# Patient Record
Sex: MALE | Race: WHITE | Employment: UNEMPLOYED | URBAN - METROPOLITAN AREA
[De-identification: names, ages, dates, MRNs, and addresses within clinical notes are randomized per-mention and may not be internally consistent; named-entity substitution may affect disease eponyms.]

---

## 2020-05-25 ENCOUNTER — APPOINTMENT (OUTPATIENT)
Dept: ULTRASOUND IMAGING | Age: 50
End: 2020-05-25
Payer: OTHER GOVERNMENT

## 2020-05-25 ENCOUNTER — HOSPITAL ENCOUNTER (EMERGENCY)
Age: 50
Discharge: HOME | End: 2020-05-25
Attending: EMERGENCY MEDICINE
Payer: OTHER GOVERNMENT

## 2020-05-25 VITALS
HEART RATE: 90 BPM | RESPIRATION RATE: 14 BRPM | DIASTOLIC BLOOD PRESSURE: 94 MMHG | OXYGEN SATURATION: 96 % | TEMPERATURE: 97.5 F | BODY MASS INDEX: 38.36 KG/M2 | SYSTOLIC BLOOD PRESSURE: 153 MMHG | WEIGHT: 315 LBS | HEIGHT: 76 IN

## 2020-05-25 PROCEDURE — 99283 EMERGENCY DEPT VISIT LOW MDM: CPT

## 2020-05-25 PROCEDURE — 93971 EXTREMITY STUDY: CPT

## 2020-05-25 RX ORDER — HYDROCORTISONE 2.5% / KETOCONAZOLE 2% 2.5; 2 G/100G; G/100G
1 CREAM TOPICAL 3 TIMES DAILY
Qty: 30 G | Refills: 0 | Status: SHIPPED | OUTPATIENT
Start: 2020-05-25 | End: 2020-06-04

## 2020-05-25 ASSESSMENT — ENCOUNTER SYMPTOMS
COUGH: 0
CHEST TIGHTNESS: 0
SHORTNESS OF BREATH: 0
COLOR CHANGE: 0

## 2020-05-25 ASSESSMENT — PAIN SCALES - GENERAL: PAINLEVEL_OUTOF10: 5

## 2020-05-25 NOTE — ED PROVIDER NOTES
Breath sounds: Normal breath sounds. Musculoskeletal: Normal range of motion. General: Tenderness present. No swelling or deformity. Comments: Tenderness to palpation to the mid left calf. Erythematous rash to the top of the left foot and ankle. There is no warmth, induration or edema. .  Full range of motion of the ankle. No Achilles tenderness. Distal sensation intact. 2+ DPA and PTA pulses. Skin:     General: Skin is warm and dry. Capillary Refill: Capillary refill takes less than 2 seconds. Findings: No erythema. Neurological:      General: No focal deficit present. Mental Status: He is alert and oriented to person, place, and time. Mental status is at baseline. Coordination: Coordination normal.   Psychiatric:         Mood and Affect: Mood normal.         Behavior: Behavior normal.         Thought Content: Thought content normal.            ------------------------------ ED COURSE/MEDICAL DECISION MAKING----------------------  Medications - No data to display      ED COURSE:      US DUP LOWER EXTREMITY LEFT JES   Final Result   No evidence of DVT in the left lower extremity. US DUP LOWER EXTREMITY LEFT JES   Final Result   No evidence of DVT in the left lower extremity. Procedures:  Procedures     Medical Decision Making:   MDM   58-year-old male presenting the ED for an ultrasound of his left lower extremity. He has had calf pain for about the last week after injuring his leg. He was given a loading dose of Lovenox last night. Ultrasound shows no acute DVT. There is an erythematous rash but it does not appear to be cellulitis. It appears to be either eczema versus fungal in nature. Patient will be treated with topical ointment. Calf pain is likely related to a muscle strain. He is encouraged to follow-up with dermatology and his primary care physician or return with any new or worsening symptoms. Counseling:    The emergency provider has spoken with the patient and discussed todays results, in addition to providing specific details for the plan of care and counseling regarding the diagnosis and prognosis. Questions are answered at this time and they are agreeable with the plan.      --------------------------------- IMPRESSION AND DISPOSITION ---------------------------------    IMPRESSION  1. Pain of left calf        DISPOSITION  Disposition: Discharge to home  Patient condition is good      Electronically signed by Whit Alberts PA-C   DD: 5/25/20  **This report was transcribed using voice recognition software. Every effort was made to ensure accuracy; however, inadvertent computerized transcription errors may be present.   END OF ED PROVIDER NOTE          Whit Alberts PA-C  05/25/20 0334

## 2020-07-06 ENCOUNTER — HOSPITAL ENCOUNTER (EMERGENCY)
Age: 50
Discharge: HOME | End: 2020-07-06
Attending: EMERGENCY MEDICINE
Payer: OTHER GOVERNMENT

## 2020-07-06 VITALS
BODY MASS INDEX: 38.36 KG/M2 | SYSTOLIC BLOOD PRESSURE: 148 MMHG | RESPIRATION RATE: 16 BRPM | TEMPERATURE: 97.2 F | HEART RATE: 88 BPM | OXYGEN SATURATION: 98 % | DIASTOLIC BLOOD PRESSURE: 84 MMHG | WEIGHT: 315 LBS | HEIGHT: 76 IN

## 2020-07-06 LAB
ANION GAP SERPL CALCULATED.3IONS-SCNC: 15 MMOL/L (ref 7–16)
BUN BLDV-MCNC: 19 MG/DL (ref 6–20)
CALCIUM SERPL-MCNC: 9.4 MG/DL (ref 8.6–10.2)
CHLORIDE BLD-SCNC: 98 MMOL/L (ref 98–107)
CO2: 22 MMOL/L (ref 22–29)
CREAT SERPL-MCNC: 1 MG/DL (ref 0.7–1.2)
D DIMER: <200 NG/ML DDU
GFR AFRICAN AMERICAN: >60
GFR NON-AFRICAN AMERICAN: >60 ML/MIN/1.73
GLUCOSE BLD-MCNC: 272 MG/DL (ref 74–99)
HCT VFR BLD CALC: 49.2 % (ref 37–54)
HEMOGLOBIN: 16.7 G/DL (ref 12.5–16.5)
MCH RBC QN AUTO: 32.6 PG (ref 26–35)
MCHC RBC AUTO-ENTMCNC: 33.9 % (ref 32–34.5)
MCV RBC AUTO: 96.1 FL (ref 80–99.9)
PDW BLD-RTO: 13.3 FL (ref 11.5–15)
PLATELET # BLD: 139 E9/L (ref 130–450)
PMV BLD AUTO: 10.2 FL (ref 7–12)
POTASSIUM SERPL-SCNC: 4.1 MMOL/L (ref 3.5–5)
RBC # BLD: 5.12 E12/L (ref 3.8–5.8)
SODIUM BLD-SCNC: 135 MMOL/L (ref 132–146)
WBC # BLD: 9.6 E9/L (ref 4.5–11.5)

## 2020-07-06 PROCEDURE — 96372 THER/PROPH/DIAG INJ SC/IM: CPT

## 2020-07-06 PROCEDURE — 85027 COMPLETE CBC AUTOMATED: CPT

## 2020-07-06 PROCEDURE — 6360000002 HC RX W HCPCS: Performed by: STUDENT IN AN ORGANIZED HEALTH CARE EDUCATION/TRAINING PROGRAM

## 2020-07-06 PROCEDURE — 85378 FIBRIN DEGRADE SEMIQUANT: CPT

## 2020-07-06 PROCEDURE — 80048 BASIC METABOLIC PNL TOTAL CA: CPT

## 2020-07-06 PROCEDURE — 99283 EMERGENCY DEPT VISIT LOW MDM: CPT

## 2020-07-06 RX ORDER — IBUPROFEN 800 MG/1
800 TABLET ORAL EVERY 8 HOURS PRN
Qty: 30 TABLET | Refills: 0 | Status: ON HOLD | OUTPATIENT
Start: 2020-07-06 | End: 2020-10-29 | Stop reason: HOSPADM

## 2020-07-06 RX ORDER — KETOROLAC TROMETHAMINE 30 MG/ML
30 INJECTION, SOLUTION INTRAMUSCULAR; INTRAVENOUS ONCE
Status: COMPLETED | OUTPATIENT
Start: 2020-07-06 | End: 2020-07-06

## 2020-07-06 RX ADMIN — KETOROLAC TROMETHAMINE 30 MG: 30 INJECTION, SOLUTION INTRAMUSCULAR at 02:04

## 2020-07-06 ASSESSMENT — PAIN SCALES - GENERAL: PAINLEVEL_OUTOF10: 10

## 2020-07-06 ASSESSMENT — PAIN DESCRIPTION - FREQUENCY: FREQUENCY: CONTINUOUS

## 2020-07-06 ASSESSMENT — PAIN DESCRIPTION - ONSET: ONSET: ON-GOING

## 2020-07-06 ASSESSMENT — PAIN DESCRIPTION - LOCATION: LOCATION: LEG

## 2020-07-06 ASSESSMENT — ENCOUNTER SYMPTOMS
BACK PAIN: 0
VOMITING: 0
RHINORRHEA: 0
ABDOMINAL PAIN: 0
NAUSEA: 0
SHORTNESS OF BREATH: 0

## 2020-07-06 ASSESSMENT — PAIN DESCRIPTION - PROGRESSION: CLINICAL_PROGRESSION: GRADUALLY WORSENING

## 2020-07-06 ASSESSMENT — PAIN DESCRIPTION - PAIN TYPE: TYPE: ACUTE PAIN

## 2020-07-06 ASSESSMENT — PAIN DESCRIPTION - DESCRIPTORS: DESCRIPTORS: STABBING

## 2020-07-06 ASSESSMENT — PAIN - FUNCTIONAL ASSESSMENT: PAIN_FUNCTIONAL_ASSESSMENT: INTOLERABLE, UNABLE TO DO ANY ACTIVE OR PASSIVE ACTIVITIES

## 2020-07-06 NOTE — ED PROVIDER NOTES
Patient is a 79-year-old male who presents to the emergency department PO with a chief complaint of left leg pain. Patient states the pain is been ongoing for approximately 6 weeks and he has been evaluated at the [place] as well as in our hospital system. Patient states pain is to the left thigh and calf and has been progressively worsening over the past 2 days over baseline. He describes the pain as sharp, nonradiating, isolated to the medial calf. Patient has had a DVT study in 5/2020 which was negative. Patient has been followed by the [place] system and is scheduled to see podiatry in the coming week. Patient states pain has been increasing to the left medial calf over the past 2 days. He is concerned for deep venous thrombosis. He has not been taking any medications for pain as he does not like to take pills. Patient denies any new trauma. Patient denies fever, chills, headache, shortness of breath, chest pain, paresthesias, weakness. Review of Systems   Constitutional: Negative for activity change, appetite change, chills and fever. HENT: Negative for congestion and rhinorrhea. Respiratory: Negative for shortness of breath. Cardiovascular: Negative for chest pain. Gastrointestinal: Negative for abdominal pain, nausea and vomiting. Genitourinary: Negative for dysuria. Musculoskeletal: Positive for myalgias. Negative for arthralgias, back pain and neck pain. Skin: Negative for wound. Neurological: Negative for dizziness, syncope, weakness, light-headedness and headaches. Physical Exam  Vitals signs and nursing note reviewed. Constitutional:       General: He is not in acute distress. Appearance: Normal appearance. He is obese. He is not ill-appearing or toxic-appearing. HENT:      Head: Normocephalic and atraumatic. Nose: Nose normal.      Mouth/Throat:      Mouth: Mucous membranes are moist.      Pharynx: Oropharynx is clear.    Eyes:      Extraocular Movements: Extraocular movements intact. Pupils: Pupils are equal, round, and reactive to light. Neck:      Musculoskeletal: Normal range of motion and neck supple. Cardiovascular:      Rate and Rhythm: Normal rate and regular rhythm. Pulses: Normal pulses. Heart sounds: Normal heart sounds. Pulmonary:      Effort: Pulmonary effort is normal.      Breath sounds: Normal breath sounds. Abdominal:      General: Bowel sounds are normal.      Palpations: Abdomen is soft. Musculoskeletal: Normal range of motion. Legs:    Skin:     General: Skin is warm and dry. Capillary Refill: Capillary refill takes less than 2 seconds. Comments: Right thigh/calf with erythema. Non-tender, no swelling, not hot. Left thigh/calf with discoloration, no erythema. Neurological:      Mental Status: He is alert and oriented to person, place, and time. MDM  Number of Diagnoses or Management Options  Pain in left leg:   Diagnosis management comments: Patient is a 58-year-old male presents with increased left leg pain over baseline. Patient has had pain for approximately 6 weeks and has been followed by [place] system as well as [facility]) system including a DVT study that was reviewed, negative. Patient states pain is increased over the past 2 days, no new trauma. Patient is awake alert following all commands, vital signs reviewed, afebrile. Patient is neurovascularly intact. Patient denies any sedentary lifestyle, malignancy, shortness of breath. Patient with left leg pain is concern for new DVT. Patient [facility] score is low risk for DVT. Patient physical exam is significant for tenderness to the left calf, albeit more lateral aspect. Homans sign is negative. Patient with noted skin discoloration to the bilateral legs, consistent with peripheral vascular disease. Patient without any swelling, erythema, warmness. Decision was made for d-dimer which was negative.   Basic labs also obtained with no significant findings. Patient treated for pain with Toradol for analgesia. Patient with relief in pain. Patient reassured and low risk for DVT. labs discussed with patient and plan for discharge and follow-up with primary care. Patient is agreeable to plan and is stable for discharge. All questions answered at bedside. Patient agrees to follow-up with PCP. Amount and/or Complexity of Data Reviewed  Clinical lab tests: ordered and reviewed           --------------------------------------------- PAST HISTORY ---------------------------------------------  Past Medical History:  has a past medical history of Anxiety, Depression, Headache(784.0), Hypertension, Narcolepsy, Obesity, PTSD (post-traumatic stress disorder), Tinnitus, Tobacco abuse, and Unspecified sleep apnea. Past Surgical History:  has a past surgical history that includes Hand surgery. Social History:  reports that he has been smoking cigarettes. He has a 30.00 pack-year smoking history. He has never used smokeless tobacco. He reports that he does not drink alcohol or use drugs. Family History: family history is not on file. The patients home medications have been reviewed. Allergies: Patient has no known allergies.     -------------------------------------------------- RESULTS -------------------------------------------------  Labs:  Results for orders placed or performed during the hospital encounter of 07/06/20   D-Dimer, Quantitative   Result Value Ref Range    D-Dimer, Quant <200 ng/mL DDU   CBC   Result Value Ref Range    WBC 9.6 4.5 - 11.5 E9/L    RBC 5.12 3.80 - 5.80 E12/L    Hemoglobin 16.7 (H) 12.5 - 16.5 g/dL    Hematocrit 49.2 37.0 - 54.0 %    MCV 96.1 80.0 - 99.9 fL    MCH 32.6 26.0 - 35.0 pg    MCHC 33.9 32.0 - 34.5 %    RDW 13.3 11.5 - 15.0 fL    Platelets 823 752 - 622 E9/L    MPV 10.2 7.0 - 12.0 fL   Basic metabolic panel   Result Value Ref Range    Sodium 135 132 - 146 mmol/L    Potassium 4.1 3.5 - 5.0 mmol/L    Chloride 98 98 - 107 mmol/L    CO2 22 22 - 29 mmol/L    Anion Gap 15 7 - 16 mmol/L    Glucose 272 (H) 74 - 99 mg/dL    BUN 19 6 - 20 mg/dL    CREATININE 1.0 0.7 - 1.2 mg/dL    GFR Non-African American >60 >=60 mL/min/1.73    GFR African American >60     Calcium 9.4 8.6 - 10.2 mg/dL       Radiology:  No orders to display       ------------------------- NURSING NOTES AND VITALS REVIEWED ---------------------------  Date / Time Roomed:  7/6/2020 12:56 AM  ED Bed Assignment:  14/14    The nursing notes within the ED encounter and vital signs as below have been reviewed. BP (!) 148/84   Pulse 88   Temp 97.2 °F (36.2 °C) (Temporal)   Resp 16   Ht 6' 4\" (1.93 m)   Wt (!) 330 lb (149.7 kg)   SpO2 98%   BMI 40.17 kg/m²   Oxygen Saturation Interpretation: Normal      ------------------------------------------ PROGRESS NOTES ------------------------------------------  7:52 AM EDT  I have spoken with the patient and discussed todays results, in addition to providing specific details for the plan of care and counseling regarding the diagnosis and prognosis. Their questions are answered at this time and they are agreeable with the plan. I discussed at length with them reasons for immediate return here for re evaluation. They will followup with their primary care physician by calling their office on Monday.      --------------------------------- ADDITIONAL PROVIDER NOTES ---------------------------------  At this time the patient is without objective evidence of an acute process requiring hospitalization or inpatient management. They have remained hemodynamically stable throughout their entire ED visit and are stable for discharge with outpatient follow-up. The plan has been discussed in detail and they are aware of the specific conditions for emergent return, as well as the importance of follow-up.       Discharge Medication List as of 7/6/2020  2:11 AM      START taking these medications Details   ibuprofen (ADVIL;MOTRIN) 800 MG tablet Take 1 tablet by mouth every 8 hours as needed for Pain, Disp-30 tablet, R-0Print           Diagnosis:  1. Pain in left leg      Disposition:  Patient's disposition: Discharge to home  Patient's condition is stable. 7/6/20, 7:52 AM EDT.     This note is prepared by Quan Araiza MD -[id number]-[id number]             Quan Araiza MD  Resident  07/06/20 [id number]

## 2020-10-28 ENCOUNTER — HOSPITAL ENCOUNTER (INPATIENT)
Age: 50
LOS: 1 days | Discharge: ANOTHER HOSPITAL | DRG: 066 | End: 2020-10-29
Attending: EMERGENCY MEDICINE | Admitting: INTERNAL MEDICINE
Payer: OTHER GOVERNMENT

## 2020-10-28 ENCOUNTER — APPOINTMENT (OUTPATIENT)
Dept: CT IMAGING | Age: 50
DRG: 066 | End: 2020-10-28
Payer: OTHER GOVERNMENT

## 2020-10-28 ENCOUNTER — APPOINTMENT (OUTPATIENT)
Dept: GENERAL RADIOLOGY | Age: 50
DRG: 066 | End: 2020-10-28
Payer: OTHER GOVERNMENT

## 2020-10-28 PROBLEM — I63.9 ACUTE CVA (CEREBROVASCULAR ACCIDENT) (HCC): Status: ACTIVE | Noted: 2020-10-28

## 2020-10-28 LAB
ALBUMIN SERPL-MCNC: 4.7 G/DL (ref 3.5–5.2)
ALP BLD-CCNC: 85 U/L (ref 40–129)
ALT SERPL-CCNC: 60 U/L (ref 0–40)
ANION GAP SERPL CALCULATED.3IONS-SCNC: 15 MMOL/L (ref 7–16)
APTT: 27.1 SEC (ref 24.5–35.1)
AST SERPL-CCNC: 51 U/L (ref 0–39)
BASOPHILS ABSOLUTE: 0.05 E9/L (ref 0–0.2)
BASOPHILS RELATIVE PERCENT: 0.4 % (ref 0–2)
BILIRUB SERPL-MCNC: 0.5 MG/DL (ref 0–1.2)
BILIRUBIN URINE: NEGATIVE
BLOOD, URINE: NEGATIVE
BUN BLDV-MCNC: 17 MG/DL (ref 6–20)
CALCIUM SERPL-MCNC: 10 MG/DL (ref 8.6–10.2)
CHLORIDE BLD-SCNC: 95 MMOL/L (ref 98–107)
CHP ED QC CHECK: YES
CLARITY: CLEAR
CO2: 24 MMOL/L (ref 22–29)
COLOR: YELLOW
CREAT SERPL-MCNC: 0.9 MG/DL (ref 0.7–1.2)
EOSINOPHILS ABSOLUTE: 0.22 E9/L (ref 0.05–0.5)
EOSINOPHILS RELATIVE PERCENT: 1.7 % (ref 0–6)
GFR AFRICAN AMERICAN: >60
GFR NON-AFRICAN AMERICAN: >60 ML/MIN/1.73
GLUCOSE BLD-MCNC: 171 MG/DL (ref 74–99)
GLUCOSE BLD-MCNC: 176 MG/DL
GLUCOSE URINE: NEGATIVE MG/DL
HCT VFR BLD CALC: 54.7 % (ref 37–54)
HEMOGLOBIN: 18.4 G/DL (ref 12.5–16.5)
IMMATURE GRANULOCYTES #: 0.06 E9/L
IMMATURE GRANULOCYTES %: 0.5 % (ref 0–5)
INR BLD: 1
KETONES, URINE: NEGATIVE MG/DL
LEUKOCYTE ESTERASE, URINE: NEGATIVE
LYMPHOCYTES ABSOLUTE: 2.51 E9/L (ref 1.5–4)
LYMPHOCYTES RELATIVE PERCENT: 19.2 % (ref 20–42)
MCH RBC QN AUTO: 32.2 PG (ref 26–35)
MCHC RBC AUTO-ENTMCNC: 33.6 % (ref 32–34.5)
MCV RBC AUTO: 95.6 FL (ref 80–99.9)
METER GLUCOSE: 176 MG/DL (ref 74–99)
MONOCYTES ABSOLUTE: 1.1 E9/L (ref 0.1–0.95)
MONOCYTES RELATIVE PERCENT: 8.4 % (ref 2–12)
NEUTROPHILS ABSOLUTE: 9.15 E9/L (ref 1.8–7.3)
NEUTROPHILS RELATIVE PERCENT: 69.8 % (ref 43–80)
NITRITE, URINE: NEGATIVE
PDW BLD-RTO: 13.6 FL (ref 11.5–15)
PH UA: 5 (ref 5–9)
PLATELET # BLD: 165 E9/L (ref 130–450)
PMV BLD AUTO: 10.4 FL (ref 7–12)
POTASSIUM REFLEX MAGNESIUM: 4.2 MMOL/L (ref 3.5–5)
PROTEIN UA: NEGATIVE MG/DL
PROTHROMBIN TIME: 11.5 SEC (ref 9.3–12.4)
RBC # BLD: 5.72 E12/L (ref 3.8–5.8)
SODIUM BLD-SCNC: 134 MMOL/L (ref 132–146)
SPECIFIC GRAVITY UA: <=1.005 (ref 1–1.03)
TOTAL PROTEIN: 8.4 G/DL (ref 6.4–8.3)
TROPONIN: <0.01 NG/ML (ref 0–0.03)
UROBILINOGEN, URINE: 0.2 E.U./DL
WBC # BLD: 13.1 E9/L (ref 4.5–11.5)

## 2020-10-28 PROCEDURE — 6360000004 HC RX CONTRAST MEDICATION: Performed by: RADIOLOGY

## 2020-10-28 PROCEDURE — 70496 CT ANGIOGRAPHY HEAD: CPT

## 2020-10-28 PROCEDURE — 82962 GLUCOSE BLOOD TEST: CPT

## 2020-10-28 PROCEDURE — 70498 CT ANGIOGRAPHY NECK: CPT

## 2020-10-28 PROCEDURE — 6370000000 HC RX 637 (ALT 250 FOR IP): Performed by: STUDENT IN AN ORGANIZED HEALTH CARE EDUCATION/TRAINING PROGRAM

## 2020-10-28 PROCEDURE — 2580000003 HC RX 258: Performed by: STUDENT IN AN ORGANIZED HEALTH CARE EDUCATION/TRAINING PROGRAM

## 2020-10-28 PROCEDURE — 85025 COMPLETE CBC W/AUTO DIFF WBC: CPT

## 2020-10-28 PROCEDURE — 84484 ASSAY OF TROPONIN QUANT: CPT

## 2020-10-28 PROCEDURE — 80053 COMPREHEN METABOLIC PANEL: CPT

## 2020-10-28 PROCEDURE — 85610 PROTHROMBIN TIME: CPT

## 2020-10-28 PROCEDURE — 93005 ELECTROCARDIOGRAM TRACING: CPT | Performed by: STUDENT IN AN ORGANIZED HEALTH CARE EDUCATION/TRAINING PROGRAM

## 2020-10-28 PROCEDURE — 70450 CT HEAD/BRAIN W/O DYE: CPT

## 2020-10-28 PROCEDURE — 1200000000 HC SEMI PRIVATE

## 2020-10-28 PROCEDURE — 99284 EMERGENCY DEPT VISIT MOD MDM: CPT

## 2020-10-28 PROCEDURE — 36415 COLL VENOUS BLD VENIPUNCTURE: CPT

## 2020-10-28 PROCEDURE — 81003 URINALYSIS AUTO W/O SCOPE: CPT

## 2020-10-28 PROCEDURE — 71045 X-RAY EXAM CHEST 1 VIEW: CPT

## 2020-10-28 PROCEDURE — 2580000003 HC RX 258: Performed by: INTERNAL MEDICINE

## 2020-10-28 PROCEDURE — 94660 CPAP INITIATION&MGMT: CPT

## 2020-10-28 PROCEDURE — 0042T CT BRAIN PERFUSION: CPT

## 2020-10-28 PROCEDURE — 85730 THROMBOPLASTIN TIME PARTIAL: CPT

## 2020-10-28 RX ORDER — CLOPIDOGREL BISULFATE 75 MG/1
75 TABLET ORAL DAILY
Status: DISCONTINUED | OUTPATIENT
Start: 2020-10-29 | End: 2020-10-29 | Stop reason: HOSPADM

## 2020-10-28 RX ORDER — SODIUM CHLORIDE 0.9 % (FLUSH) 0.9 %
10 SYRINGE (ML) INJECTION PRN
Status: DISCONTINUED | OUTPATIENT
Start: 2020-10-28 | End: 2020-10-29 | Stop reason: HOSPADM

## 2020-10-28 RX ORDER — ASPIRIN 81 MG/1
81 TABLET, CHEWABLE ORAL DAILY
Status: DISCONTINUED | OUTPATIENT
Start: 2020-10-29 | End: 2020-10-29 | Stop reason: HOSPADM

## 2020-10-28 RX ORDER — DEXTROSE MONOHYDRATE 25 G/50ML
12.5 INJECTION, SOLUTION INTRAVENOUS PRN
Status: DISCONTINUED | OUTPATIENT
Start: 2020-10-28 | End: 2020-10-29 | Stop reason: HOSPADM

## 2020-10-28 RX ORDER — DEXTROSE MONOHYDRATE 50 MG/ML
100 INJECTION, SOLUTION INTRAVENOUS PRN
Status: DISCONTINUED | OUTPATIENT
Start: 2020-10-28 | End: 2020-10-29 | Stop reason: HOSPADM

## 2020-10-28 RX ORDER — SODIUM CHLORIDE 0.9 % (FLUSH) 0.9 %
10 SYRINGE (ML) INJECTION EVERY 12 HOURS SCHEDULED
Status: DISCONTINUED | OUTPATIENT
Start: 2020-10-28 | End: 2020-10-29 | Stop reason: HOSPADM

## 2020-10-28 RX ORDER — NICOTINE POLACRILEX 4 MG
15 LOZENGE BUCCAL PRN
Status: DISCONTINUED | OUTPATIENT
Start: 2020-10-28 | End: 2020-10-29 | Stop reason: HOSPADM

## 2020-10-28 RX ORDER — 0.9 % SODIUM CHLORIDE 0.9 %
1000 INTRAVENOUS SOLUTION INTRAVENOUS ONCE
Status: COMPLETED | OUTPATIENT
Start: 2020-10-28 | End: 2020-10-28

## 2020-10-28 RX ORDER — POLYETHYLENE GLYCOL 3350 17 G/17G
17 POWDER, FOR SOLUTION ORAL DAILY PRN
Status: DISCONTINUED | OUTPATIENT
Start: 2020-10-28 | End: 2020-10-29 | Stop reason: HOSPADM

## 2020-10-28 RX ORDER — ASPIRIN 81 MG/1
324 TABLET, CHEWABLE ORAL ONCE
Status: COMPLETED | OUTPATIENT
Start: 2020-10-28 | End: 2020-10-28

## 2020-10-28 RX ORDER — ACETAMINOPHEN 325 MG/1
650 TABLET ORAL EVERY 6 HOURS PRN
Status: DISCONTINUED | OUTPATIENT
Start: 2020-10-28 | End: 2020-10-29 | Stop reason: HOSPADM

## 2020-10-28 RX ORDER — ACETAMINOPHEN 650 MG/1
650 SUPPOSITORY RECTAL EVERY 6 HOURS PRN
Status: DISCONTINUED | OUTPATIENT
Start: 2020-10-28 | End: 2020-10-29 | Stop reason: HOSPADM

## 2020-10-28 RX ADMIN — SODIUM CHLORIDE 1000 ML: 9 INJECTION, SOLUTION INTRAVENOUS at 17:51

## 2020-10-28 RX ADMIN — IOPAMIDOL 120 ML: 755 INJECTION, SOLUTION INTRAVENOUS at 17:39

## 2020-10-28 RX ADMIN — ASPIRIN 324 MG: 81 TABLET, CHEWABLE ORAL at 19:16

## 2020-10-28 RX ADMIN — Medication 10 ML: at 21:39

## 2020-10-28 ASSESSMENT — ENCOUNTER SYMPTOMS
EYE PAIN: 0
DIARRHEA: 0
EYE REDNESS: 0
TACHYPNEA: 1
CHEST TIGHTNESS: 0
WHEEZING: 0
SHORTNESS OF BREATH: 0
BACK PAIN: 0
RHINORRHEA: 0
CONSTIPATION: 0
TROUBLE SWALLOWING: 0
VOMITING: 0
APNEA: 0
PHOTOPHOBIA: 0
COUGH: 0
NAUSEA: 0
SORE THROAT: 0
ABDOMINAL PAIN: 0

## 2020-10-28 NOTE — CONSULTS
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 Patients symptoms and exam improvied while in ER . States TPA not given because of improving symptoms. Patient had previous episode that apparently resolved one month ago. Discussed risk vs benefit of intervention, risks of intervention appear to be greater then benefits at this time. A/P Probable hemodynamically  significant atherosclerotic disease of basilar artery and bilateral carotids with significant stenopsis. No intervention at this time. Consider basilar artery intervention once maximal antiplatelet for 5 days or significant symptoms with  failed medical therapy. Transfer as necessary or if change in status. MRI for further evaluation to evaluate for brainstem infarct.

## 2020-10-28 NOTE — ED PROVIDER NOTES
[address] [facility] 5 PIC/ICU  EMERGENCY DEPARTMENT ENCOUNTER      Pt Name: Jose Conway  MRN: [id number]  [place] 1970  Date of evaluation: 10/28/2020      CHIEF COMPLAINT       Chief Complaint   Patient presents with    Cerebrovascular Accident        HPI  Jose Conway is a 45 y.o. male with history of hypertension, diabetes who presents to the emergency department with complaints of right upper and right lower extremity decreased sensation. The patient states that approximately an hour prior to arrival he started having decrease in station to his right upper and right lower extremity. He states he also began to notice left-sided vision changes as well as facial droop. He states that the facial droop and vision changes improved after about 10 to 15 minutes. He continued to have decrease in station and numbness feeling to his arm and leg and therefore called 911. He states that his symptoms have mildly improved at this point is only having decreased incision to his right arm. He states that about a month ago he had several episodes of similar decreased sensation that lasted about 10 minutes before resolving. At that time he went to a ER in [place] where they discharged him after \"normal head CT and blood\". Currently patient denies any headache, vision changes, chest pain, shortness of breath. Denies any injury or trauma. Denies any history of any stroke or brain bleed. Is not any blood thinners. Except as noted above the remainder of the review of systems was reviewed and negative. Review of Systems   Constitutional: Negative for activity change, chills, diaphoresis, fatigue and fever. HENT: Negative for rhinorrhea, sore throat and trouble swallowing. Eyes: Positive for visual disturbance. Negative for photophobia, pain and redness. Respiratory: Negative for apnea, cough, chest tightness, shortness of breath and wheezing.     Cardiovascular: Negative for chest pain, palpitations and leg swelling. Gastrointestinal: Negative for abdominal pain, constipation, diarrhea, nausea and vomiting. Endocrine: Negative for polyuria. Genitourinary: Negative for difficulty urinating and dysuria. Musculoskeletal: Negative for back pain, neck pain and neck stiffness. Skin: Negative for pallor and rash. Neurological: Positive for facial asymmetry and numbness. Negative for dizziness, syncope, weakness and light-headedness. Psychiatric/Behavioral: Negative for confusion. The patient is not nervous/anxious. Physical Exam  Vitals signs and nursing note reviewed. Constitutional:       General: He is not in acute distress. Appearance: He is well-developed. Comments: Awake and alert. Sitting in the gurney in no obvious distress. HENT:      Head: Normocephalic and atraumatic. Mouth/Throat:      Mouth: Mucous membranes are moist.      Pharynx: No oropharyngeal exudate. Eyes:      General: No scleral icterus. Pupils: Pupils are equal, round, and reactive to light. Neck:      Musculoskeletal: Normal range of motion and neck supple. Cardiovascular:      Rate and Rhythm: Normal rate and regular rhythm. Heart sounds: Normal heart sounds. No murmur. Comments: 2+ radial and dorsal pedis pulses bilaterally  Pulmonary:      Effort: Pulmonary effort is normal. No respiratory distress. Breath sounds: Normal breath sounds. No wheezing. Abdominal:      Palpations: Abdomen is soft. Tenderness: There is no abdominal tenderness. There is no guarding or rebound. Musculoskeletal: Normal range of motion. General: No tenderness or deformity. Right lower leg: No edema. Left lower leg: No edema. Skin:     General: Skin is warm and dry. Capillary Refill: Capillary refill takes less than 2 seconds. Findings: No rash. Neurological:      Mental Status: He is alert and oriented to person, place, and time.       Cranial Nerves: No cranial nerve deficit. Sensory: No sensory deficit. Motor: No weakness or abnormal muscle tone. Comments: Mild drift to the right upper extremity. Decreased sensation to the right upper extremity. No facial droop. Normal strength and sensation to the lower extremities bilaterally as well as the left upper extremity. No dysphagia or dysarthria. Cranial nerves grossly intact without obvious facial droop. Psychiatric:         Mood and Affect: Mood normal.         Behavior: Behavior normal.          Procedures     MDM   This is a 77-year-old male with a history of diabetes on Metformin who presents to the emergency department with acute onset right-sided upper and lower extremity sensory change as well as vision changes to his left eye and facial droop that began about an hour prior to arrival.  The vision changes and facial droop resolved prior to arrival.  Initial exam shows mild drift to the right upper extremity and decreased sensation to the right upper extremity. His other symptoms had improved. NIH initially of 2. Stroke alert called. Telemetry neuro stroke service was consulted and agreed that TPA is not indicated given the patient's improving symptoms and low NIH as risks would outweigh the benefits of any TPA administration. 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 Discussed case with Dr. Narinder Rome, who reviewed the case and agreed that at this time further invasive intervention by interventional radiology is not indicated given patient improving symptoms low and age and risks of surgery would by far outweigh any benefits. The patient understood this and agrees to the plan. adMinistered aspirin to treat medically. Metabolic panel showed normal electrolytes, normal renal function with creatinine 0.9.   EKG showed no ischemic change troponin was negative and this likely acute cardiac etiology of his symptoms. Patient continue remained stable. Dr. Izaiah Orozco recommended that if he did have any progression of symptoms at that point repeat work-up should be initiated and possibly patient transferred downtown for further evaluation given findings today. Discussed the case with Dr. Devon Bravo, who accepts patient for Dr. Castillo Tamayo for further evaluation and neurologic evaluation. ED Course as of Oct 28 2053   Wed Oct 28, 2020   1644 ATTENDING PROVIDER ATTESTATION:     Marianela Mancuso presented to the emergency department for evaluation of [unfilled]  I have reviewed and discussed the case, including pertinent history (medical, surgical, family and social) and exam findings with the Midlevel and the Nurse assigned to Marianela Mancuso. I have personally performed and/or participated in the history, exam, medical decision making, and procedures and agree with all pertinent clinical information. I have reviewed my findings and recommendations with Marianela Mancuso and members of family present at the time of disposition. My findings/plan: Patient is a 40-year-old male who presents the chief complaint of right-sided facial droop and right-sided tingling of the arm and leg. Patient states his symptoms started about an hour prior to arrival.  He states that he was walking down his driveway to get his mail when he had onset of tingling of the left upper and lower extremity, then he started to feel a facial droop on the right side. Patient states that the facial droop has improved but he still has remaining tingling of the arm and leg. He states that he has had intermittent tingling of the right arm and leg for about a month, but the symptoms do not last very long. States that this is the long that they have ever lasted which prompted him to come to the emergency department. He denies any history of CVA in the past.  Patient denies any recent head trauma or falls.   On examination the patient is resting comfortably in bed. Clear breath sounds in all lung fields. Regular rate and rhythm of the heart. No abdominal tenderness to palpation. No lower extremity edema. Patient has no facial droop or slurred speech. Pupils are equal and reactive bilaterally. No hemotympanum noted. Patient does have mild sensory deficit of the right upper extremity. Normal  strength. NIH 2. Patient is alert and oriented x3. Scooby Ortega DO      [MS]   8141 Discussed with radiologist about the Ct head without contrast: Who does not see acute hemorrhage or large acute infarct on Noncon CT scan of the head.    [LM]   1218 Discussed with neurology telestroke Dr. Za Purvis, who recommends it given the patient's sensory symptoms only, improvement in symptoms since onset 90 minutes ago, low NIH score, that TPA is not recommended at this time. Continue to wait for CTA imaging to result. Patient remained stable.    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 [LM]   9770 Exam the patient he continues just to have mild sensory change of the right upper extremity. Discussed with him the CT results and that we are waiting for the neuro interventional radiologist.  He is requesting food however we will have him n.p.o. until after I talk with the interventionalists. [LM]      ED Course User Index  [LM] Gerard Wood DO  [MS] Analilia Mistry DO         ED Course as of Oct 28 2053   Wed Oct 28, 2020   1644 ATTENDING PROVIDER ATTESTATION:     Vikas Cheung presented to the emergency department for evaluation of [unfilled]  I have reviewed and discussed the case, including pertinent history (medical, surgical, family and social) and exam findings with the Midlevel and the Nurse assigned to Vikas Cheung.   I have personally performed and/or participated in the history, exam, medical decision making, and procedures and agree with all pertinent clinical information. I have reviewed my findings and recommendations with Alaina Gao and members of family present at the time of disposition. My findings/plan: Patient is a 62-year-old male who presents the chief complaint of right-sided facial droop and right-sided tingling of the arm and leg. Patient states his symptoms started about an hour prior to arrival.  He states that he was walking down his driveway to get his mail when he had onset of tingling of the left upper and lower extremity, then he started to feel a facial droop on the right side. Patient states that the facial droop has improved but he still has remaining tingling of the arm and leg. He states that he has had intermittent tingling of the right arm and leg for about a month, but the symptoms do not last very long. States that this is the long that they have ever lasted which prompted him to come to the emergency department. He denies any history of CVA in the past.  Patient denies any recent head trauma or falls. On examination the patient is resting comfortably in bed. Clear breath sounds in all lung fields. Regular rate and rhythm of the heart. No abdominal tenderness to palpation. No lower extremity edema. Patient has no facial droop or slurred speech. Pupils are equal and reactive bilaterally. No hemotympanum noted. Patient does have mild sensory deficit of the right upper extremity. Normal  strength. NIH 2. Patient is alert and oriented x3. Farhana Galindo DO      [MS]   9615 Discussed with radiologist about the Ct head without contrast: Who does not see acute hemorrhage or large acute infarct on Noncon CT scan of the head.    [LM]   9848 Discussed with neurology telestroke Dr. Miguel Guallpa, who recommends it given the patient's sensory symptoms only, improvement in symptoms since onset 90 minutes ago, low NIH score, that TPA is not recommended at this time. Continue to wait for CTA imaging to result.   Patient remained stable.    [LM]   0870 Discussed with Dr. 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 [LM]   1840 Exam the patient he continues just to have mild sensory change of the right upper extremity. Discussed with him the CT results and that we are waiting for the neuro interventional radiologist.  He is requesting food however we will have him n.p.o. until after I talk with the interventionalists. [LM]      ED Course User Index  [LM] Juanjose Serrano DO  [MS] Damián Carlos DO       --------------------------------------------- PAST HISTORY ---------------------------------------------  Past Medical History:  has a past medical history of Acute CVA (cerebrovascular accident) ([facility] [address] Anxiety, Depression, Headache(784.0), Hypertension, Narcolepsy, Obesity, PTSD (post-traumatic stress disorder), Tinnitus, Tobacco abuse, and Unspecified sleep apnea. Past Surgical History:  has a past surgical history that includes Hand surgery. Social History:  reports that he has been smoking cigarettes. He has a 30.00 pack-year smoking history. He has never used smokeless tobacco. He reports that he does not drink alcohol or use drugs. Family History: family history is not on file. The patients home medications have been reviewed. Allergies: Patient has no known allergies.     -------------------------------------------------- RESULTS -------------------------------------------------    LABS:  Results for orders placed or performed during the hospital encounter of 10/28/20   APTT   Result Value Ref Range    aPTT 27.1 24.5 - 35.1 sec   Urinalysis, reflex to microscopic   Result Value Ref Range    Color, UA Yellow Straw/Yellow    Clarity, UA Clear Clear    Glucose, Ur Negative Negative mg/dL    Bilirubin Urine Negative Negative    Ketones, Urine Negative Negative mg/dL    Specific Gravity, UA <=1.005 1.005 - 1.030    Blood, Urine Negative Negative    pH, UA 5.0 5.0 - 9.0 Protein, UA Negative Negative mg/dL    Urobilinogen, Urine 0.2 <2.0 E.U./dL    Nitrite, Urine Negative Negative    Leukocyte Esterase, Urine Negative Negative   CBC Auto Differential   Result Value Ref Range    WBC 13.1 (H) 4.5 - 11.5 E9/L    RBC 5.72 3.80 - 5.80 E12/L    Hemoglobin 18.4 (H) 12.5 - 16.5 g/dL    Hematocrit 54.7 (H) 37.0 - 54.0 %    MCV 95.6 80.0 - 99.9 fL    MCH 32.2 26.0 - 35.0 pg    MCHC 33.6 32.0 - 34.5 %    RDW 13.6 11.5 - 15.0 fL    Platelets 200 141 - 579 E9/L    MPV 10.4 7.0 - 12.0 fL    Neutrophils % 69.8 43.0 - 80.0 %    Immature Granulocytes % 0.5 0.0 - 5.0 %    Lymphocytes % 19.2 (L) 20.0 - 42.0 %    Monocytes % 8.4 2.0 - 12.0 %    Eosinophils % 1.7 0.0 - 6.0 %    Basophils % 0.4 0.0 - 2.0 %    Neutrophils Absolute 9.15 (H) 1.80 - 7.30 E9/L    Immature Granulocytes # 0.06 E9/L    Lymphocytes Absolute 2.51 1.50 - 4.00 E9/L    Monocytes Absolute 1.10 (H) 0.10 - 0.95 E9/L    Eosinophils Absolute 0.22 0.05 - 0.50 E9/L    Basophils Absolute 0.05 0.00 - 0.20 E9/L   Comprehensive Metabolic Panel w/ Reflex to MG   Result Value Ref Range    Sodium 134 132 - 146 mmol/L    Potassium reflex Magnesium 4.2 3.5 - 5.0 mmol/L    Chloride 95 (L) 98 - 107 mmol/L    CO2 24 22 - 29 mmol/L    Anion Gap 15 7 - 16 mmol/L    Glucose 171 (H) 74 - 99 mg/dL    BUN 17 6 - 20 mg/dL    CREATININE 0.9 0.7 - 1.2 mg/dL    GFR Non-African American >60 >=60 mL/min/1.73    GFR African American >60     Calcium 10.0 8.6 - 10.2 mg/dL    Total Protein 8.4 (H) 6.4 - 8.3 g/dL    Alb 4.7 3.5 - 5.2 g/dL    Total Bilirubin 0.5 0.0 - 1.2 mg/dL    Alkaline Phosphatase 85 40 - 129 U/L    ALT 60 (H) 0 - 40 U/L    AST 51 (H) 0 - 39 U/L   Troponin   Result Value Ref Range    Troponin <0.01 0.00 - 0.03 ng/mL   Protime-INR   Result Value Ref Range    Protime 11.5 9.3 - 12.4 sec    INR 1.0    POCT Glucose   Result Value Ref Range    Glucose 176 mg/dL    QC OK?  yes    POCT Glucose   Result Value Ref Range    Meter Glucose 176 (H) 74 - 99 mg/dL EKG 12 Lead   Result Value Ref Range    Ventricular Rate 107 BPM    Atrial Rate 107 BPM    P-R Interval 164 ms    QRS Duration 102 ms    Q-T Interval 338 ms    QTc Calculation (Bazett) 451 ms    P Axis 20 degrees    R Axis 9 degrees    T Axis 26 degrees       RADIOLOGY:  CT Brain Perfusion   Final Result   1. No perfusion mismatch. 2. Severe focal stenosis and clot within the basilar artery. Moderate stenosis bilateral cavernous segments internal carotid arteries. RECOMMENDATIONS:   The findings were sent to the Radiology Results [address] at 5:51   pm on 10/28/2020to be communicated to a licensed caregiver. CTA HEAD W CONTRAST   Final Result   Addendum 1 of 1   ADDENDUM:   Case discussed with Dr. Jose Velazquez at 6:01 p.m. Final      CTA NECK W CONTRAST   Final Result   Unremarkable CTA of the neck. CT Head WO Contrast   Final Result   No acute intracranial bleed. Small region of decreased attenuation in the right occipital lobe. No   effacement of adjacent sulci or mass effect. Favor subacute remote infarct. Critical results were called by Dr. Rylan Gomez MD to [facility] on   10/28/2020 at 17:20. XR CHEST PORTABLE   Final Result   Minimal atherosclerotic disease. No additional cardiopulmonary pathology   identified. EKG:  This EKG is signed and interpreted by me. Rate: 107bpm  Rhythm: sinus tachycardia  Interpretation: Normal axis. No ST elevation or depression. Comparison: no previous EKG available      ------------------------- NURSING NOTES AND VITALS REVIEWED ---------------------------  Date / Time Roomed:  10/28/2020  4:47 PM  ED Bed Assignment:  [id number]    The nursing notes within the ED encounter and vital signs as below have been reviewed.      Patient Vitals for the past 24 hrs:   BP Temp Temp src Pulse Resp SpO2 Height Weight   10/28/20 2038 -- -- -- -- -- -- -- (!) 306 lb 7 oz (139 kg)   10/28/20 2015 (!)

## 2020-10-28 NOTE — ED NOTES
Bed: 08  Expected date:   Expected time:   Means of arrival:   Comments:  TRUNG Charles RN  10/28/20 [id number]

## 2020-10-28 NOTE — H&P
Department of Internal Medicine  History and Physical    PCP: Dr. Mulu Sauer   Admitting Physician: Dr. Michael Lee  Consultants:       CHIEF COMPLAINT:  Numbness/slurred speech    HISTORY OF PRESENT ILLNESS:    Patient is a 48-year-old male who presented to the ED due to right-sided numbness and visual changes. States he has intermittent right-sided numbness for the last month. He was evaluated at the [place] in [place] with CT scan of the brain. He was told he had multiple sclerosis. States however that episodes became more frequent and he had associated blurry vision. He also noticed slurred speech and that is why he came in today. He continues to smoke. He does take medication for his blood pressure. He has borderline diabetes which he controls with the medication. PAST MEDICAL Hx:  Past Medical History:   Diagnosis Date    Anxiety     Depression     Headache(784.0)     Hypertension     Narcolepsy     Obesity     PTSD (post-traumatic stress disorder)     Tinnitus     Tobacco abuse     Unspecified sleep apnea        PAST SURGICAL Hx:   Past Surgical History:   Procedure Laterality Date    HAND SURGERY         FAMILY Hx:  No family history on file. HOME MEDICATIONS:  Prior to Admission medications    Medication Sig Start Date End Date Taking? Authorizing Provider   ibuprofen (ADVIL;MOTRIN) 800 MG tablet Take 1 tablet by mouth every 8 hours as needed for Pain 7/6/20   Mary Beth Stewart MD   hydrochlorothiazide (HYDRODIURIL) 25 MG tablet Take 12.5 mg by mouth daily. Every other day    Historical Provider, MD   losartan (COZAAR) 25 MG tablet Take 25 mg by mouth daily. Takes every other day    Historical Provider, MD       ALLERGIES:  Patient has no known allergies.     SOCIAL Hx:  Social History     Socioeconomic History    Marital status: Single     Spouse name: Not on file    Number of children: Not on file    Years of education: Not on file    Highest education level: Not on file   Occupational History  Not on file   Social Needs    Financial resource strain: Not on file    Food insecurity     Worry: Not on file     Inability: Not on file    Transportation needs     Medical: Not on file     Non-medical: Not on file   Tobacco Use    Smoking status: Current Every Day Smoker     Packs/day: 1.50     Years: 20.00     Pack years: 30.00     Types: Cigarettes    Smokeless tobacco: Never Used   Substance and Sexual Activity    Alcohol use: No    Drug use: No    Sexual activity: Not on file   Lifestyle    Physical activity     Days per week: Not on file     Minutes per session: Not on file    Stress: Not on file   Relationships    Social connections     Talks on phone: Not on file     Gets together: Not on file     Attends Taoist service: Not on file     Active member of club or organization: Not on file     Attends meetings of clubs or organizations: Not on file     Relationship status: Not on file    Intimate partner violence     Fear of current or ex partner: Not on file     Emotionally abused: Not on file     Physically abused: Not on file     Forced sexual activity: Not on file   Other Topics Concern    Not on file   Social History Narrative    Not on file       ROS: positive in bold  General:   Denies chills, fatigue, fever, malaise, night sweats or weight loss    Psychological:   Denies anxiety, disorientation or hallucinations    ENT:    Denies epistaxis, headaches, vertigo or visual changes    Cardiovascular:   Denies any chest pain, irregular heartbeats, or palpitations. No paroxysmal nocturnal dyspnea. Respiratory:   Denies shortness of breath, coughing, sputum production, hemoptysis, or wheezing. No orthopnea. Gastrointestinal:   Denies nausea, vomiting, diarrhea, or constipation. Denies any abdominal pain. Denies change in bowel habits or stools. Genito-Urinary:    Denies any urgency, frequency, hematuria. Voiding without difficulty.     Musculoskeletal:   Denies joint pain, joint stiffness, joint swelling or muscle pain    Neurology:    Denies any headache or focal neurological deficits. No weakness or paresthesia. Derm:    Denies any rashes, ulcers, or excoriations. Denies bruising. Extremities:   Denies any lower extremity swelling or edema. PHYSICAL EXAM:  VITALS:  Vitals:    10/28/20 1915   BP: (!) 139/103   Pulse: 104   Resp: 18   Temp:    SpO2: 94%         CONSTITUTIONAL:    Awake, alert, cooperative, no apparent distress, and appears stated age    EYES:    PERRL, EOMI, sclera clear, conjunctiva normal    ENT:    Normocephalic, atraumatic, sinuses nontender on palpation. External ears without lesions. Oral pharynx with moist mucus membranes. Tonsils without erythema or exudates. NECK:    Supple, symmetrical, trachea midline, no adenopathy, thyroid symmetric, not enlarged and no tenderness, skin normal, no bruits, no JVD    HEMATOLOGIC/LYMPHATICS:    No cervical lymphadenopathy and no supraclavicular lymphadenopathy    LUNGS:    Symmetric. No increased work of breathing, good air exchange, clear to auscultation bilaterally, no wheezes, rhonchi, or rales,     CARDIOVASCULAR:    Normal apical impulse, regular rate and rhythm, normal S1 and S2, no S3 or S4, and no murmur noted    ABDOMEN:    No scars, normal bowel sounds, soft, non-distended, non-tender, no masses palpated, no hepatosplenomegaly, no rebound or guarding elicited on palpation     MUSCULOSKELETAL:    There is no redness, warmth, or swelling of the joints. Full range of motion noted. Motor strength is 5 out of 5 all extremities bilaterally. Tone is normal.    NEUROLOGIC:    Awake, alert, oriented to name, place and time. Cranial nerves II-XII are grossly intact. Motor is 5 out of 5 bilaterally. SKIN:    No bruising or bleeding. No redness, warmth, or swelling    EXTREMITIES:    Peripheral pulses present. No edema, cyanosis, or swelling.     OSTEOPATHIC:    Examined in seated and supine positions. Normal thoracic kyphosis and lumbar lordosis. No acute somatic dysfunction. LABORATORY DATA:  CBC with Differential:    Lab Results   Component Value Date    WBC 13.1 10/28/2020    RBC 5.72 10/28/2020    HGB 18.4 10/28/2020    HCT 54.7 10/28/2020     10/28/2020    MCV 95.6 10/28/2020    MCH 32.2 10/28/2020    MCHC 33.6 10/28/2020    RDW 13.6 10/28/2020    SEGSPCT 47 01/17/2012    SEGSPCT 64 10/20/2010    LYMPHOPCT 19.2 10/28/2020    MONOPCT 8.4 10/28/2020    EOSPCT 4 10/20/2010    BASOPCT 0.4 10/28/2020    MONOSABS 1.10 10/28/2020    LYMPHSABS 2.51 10/28/2020    EOSABS 0.22 10/28/2020    BASOSABS 0.05 10/28/2020     CMP:    Lab Results   Component Value Date     10/28/2020    K 4.2 10/28/2020    CL 95 10/28/2020    CO2 24 10/28/2020    BUN 17 10/28/2020    CREATININE 0.9 10/28/2020    GFRAA >60 10/28/2020    LABGLOM >60 10/28/2020    GLUCOSE 176 10/28/2020    GLUCOSE 171 10/28/2020    GLUCOSE 98 01/17/2012    PROT 8.4 10/28/2020    LABALBU 4.7 10/28/2020    LABALBU 4.6 01/17/2012    CALCIUM 10.0 10/28/2020    BILITOT 0.5 10/28/2020    ALKPHOS 85 10/28/2020    AST 51 10/28/2020    ALT 60 10/28/2020       ASSESSMENT/PLAN:  1. TIA versus stroke   1. CTA showed moderate stenosis of cavernous segments of the ICA bilaterally. Showed moderate stenosis and focal thrombus of basilar artery. Neurology consulted. Started on aspirin and Plavix. MRI brain ordered. PT OT consulted. Consider MS. Consider carpal tunnel syndrome for his persistent right hand numbness  2. 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 Case was discussed with interventional radiologist.  Due to improvement in symptoms and risk of surgery it was decided that he would not be a candidate for intervention at this time. They recommended medical management. 3.  Moderate bilateral internal carotid artery stenosis  4. History of sleep apnea  1. On BiPAP  5. Current tobacco abuse  6.  Severe obesity  7. Hypertension  8. Depression and anxiety  9.  Narcolepsy          Alcon Fitzgerald D.O.  7:17 PM  10/28/2020    Electronically signed by Alcon Fitzgerald DO on 10/28/20 at 7:17 PM EDT

## 2020-10-29 ENCOUNTER — APPOINTMENT (OUTPATIENT)
Dept: MRI IMAGING | Age: 50
DRG: 066 | End: 2020-10-29
Payer: OTHER GOVERNMENT

## 2020-10-29 VITALS
OXYGEN SATURATION: 94 % | BODY MASS INDEX: 37.32 KG/M2 | SYSTOLIC BLOOD PRESSURE: 146 MMHG | RESPIRATION RATE: 18 BRPM | WEIGHT: 306.44 LBS | HEART RATE: 87 BPM | TEMPERATURE: 98 F | HEIGHT: 76 IN | DIASTOLIC BLOOD PRESSURE: 90 MMHG

## 2020-10-29 PROBLEM — I65.1 BASILAR ARTERY THROMBOSIS: Status: ACTIVE | Noted: 2020-10-29

## 2020-10-29 LAB
ALBUMIN SERPL-MCNC: 4.3 G/DL (ref 3.5–5.2)
ALP BLD-CCNC: 77 U/L (ref 40–129)
ALT SERPL-CCNC: 52 U/L (ref 0–40)
ANION GAP SERPL CALCULATED.3IONS-SCNC: 11 MMOL/L (ref 7–16)
AST SERPL-CCNC: 44 U/L (ref 0–39)
BILIRUB SERPL-MCNC: 0.6 MG/DL (ref 0–1.2)
BUN BLDV-MCNC: 14 MG/DL (ref 6–20)
CALCIUM SERPL-MCNC: 9.5 MG/DL (ref 8.6–10.2)
CHLORIDE BLD-SCNC: 96 MMOL/L (ref 98–107)
CHOLESTEROL, TOTAL: 245 MG/DL (ref 0–199)
CO2: 29 MMOL/L (ref 22–29)
CREAT SERPL-MCNC: 0.9 MG/DL (ref 0.7–1.2)
EKG ATRIAL RATE: 107 BPM
EKG P AXIS: 20 DEGREES
EKG P-R INTERVAL: 164 MS
EKG Q-T INTERVAL: 338 MS
EKG QRS DURATION: 102 MS
EKG QTC CALCULATION (BAZETT): 451 MS
EKG R AXIS: 9 DEGREES
EKG T AXIS: 26 DEGREES
EKG VENTRICULAR RATE: 107 BPM
GFR AFRICAN AMERICAN: >60
GFR NON-AFRICAN AMERICAN: >60 ML/MIN/1.73
GLUCOSE BLD-MCNC: 137 MG/DL (ref 74–99)
HBA1C MFR BLD: 7.9 % (ref 4–5.6)
HCT VFR BLD CALC: 51.2 % (ref 37–54)
HDLC SERPL-MCNC: 33 MG/DL
HEMOGLOBIN: 16.7 G/DL (ref 12.5–16.5)
LDL CHOLESTEROL CALCULATED: 133 MG/DL (ref 0–99)
MAGNESIUM: 2.2 MG/DL (ref 1.6–2.6)
MCH RBC QN AUTO: 31.8 PG (ref 26–35)
MCHC RBC AUTO-ENTMCNC: 32.6 % (ref 32–34.5)
MCV RBC AUTO: 97.5 FL (ref 80–99.9)
METER GLUCOSE: 164 MG/DL (ref 74–99)
PDW BLD-RTO: 13.6 FL (ref 11.5–15)
PHOSPHORUS: 3.5 MG/DL (ref 2.5–4.5)
PLATELET # BLD: 144 E9/L (ref 130–450)
PMV BLD AUTO: 10.4 FL (ref 7–12)
POTASSIUM SERPL-SCNC: 4.1 MMOL/L (ref 3.5–5)
RBC # BLD: 5.25 E12/L (ref 3.8–5.8)
SARS-COV-2, NAAT: NOT DETECTED
SODIUM BLD-SCNC: 136 MMOL/L (ref 132–146)
TOTAL PROTEIN: 7.3 G/DL (ref 6.4–8.3)
TRIGL SERPL-MCNC: 394 MG/DL (ref 0–149)
TSH SERPL DL<=0.05 MIU/L-ACNC: 2.03 UIU/ML (ref 0.27–4.2)
VLDLC SERPL CALC-MCNC: 79 MG/DL
WBC # BLD: 11.1 E9/L (ref 4.5–11.5)

## 2020-10-29 PROCEDURE — 80061 LIPID PANEL: CPT

## 2020-10-29 PROCEDURE — 6370000000 HC RX 637 (ALT 250 FOR IP): Performed by: INTERNAL MEDICINE

## 2020-10-29 PROCEDURE — U0002 COVID-19 LAB TEST NON-CDC: HCPCS

## 2020-10-29 PROCEDURE — 93010 ELECTROCARDIOGRAM REPORT: CPT | Performed by: INTERNAL MEDICINE

## 2020-10-29 PROCEDURE — 84443 ASSAY THYROID STIM HORMONE: CPT

## 2020-10-29 PROCEDURE — 82962 GLUCOSE BLOOD TEST: CPT

## 2020-10-29 PROCEDURE — 93308 TTE F-UP OR LMTD: CPT

## 2020-10-29 PROCEDURE — 6370000000 HC RX 637 (ALT 250 FOR IP): Performed by: PSYCHIATRY & NEUROLOGY

## 2020-10-29 PROCEDURE — 85027 COMPLETE CBC AUTOMATED: CPT

## 2020-10-29 PROCEDURE — 6360000002 HC RX W HCPCS: Performed by: INTERNAL MEDICINE

## 2020-10-29 PROCEDURE — 83735 ASSAY OF MAGNESIUM: CPT

## 2020-10-29 PROCEDURE — 94660 CPAP INITIATION&MGMT: CPT

## 2020-10-29 PROCEDURE — 84100 ASSAY OF PHOSPHORUS: CPT

## 2020-10-29 PROCEDURE — 2580000003 HC RX 258: Performed by: INTERNAL MEDICINE

## 2020-10-29 PROCEDURE — 83036 HEMOGLOBIN GLYCOSYLATED A1C: CPT

## 2020-10-29 PROCEDURE — 80053 COMPREHEN METABOLIC PANEL: CPT

## 2020-10-29 PROCEDURE — 36415 COLL VENOUS BLD VENIPUNCTURE: CPT

## 2020-10-29 PROCEDURE — 70551 MRI BRAIN STEM W/O DYE: CPT

## 2020-10-29 RX ORDER — ATORVASTATIN CALCIUM 40 MG/1
80 TABLET, FILM COATED ORAL NIGHTLY
Status: DISCONTINUED | OUTPATIENT
Start: 2020-10-29 | End: 2020-10-29

## 2020-10-29 RX ORDER — ATORVASTATIN CALCIUM 40 MG/1
80 TABLET, FILM COATED ORAL ONCE
Status: DISCONTINUED | OUTPATIENT
Start: 2020-10-29 | End: 2020-10-29 | Stop reason: HOSPADM

## 2020-10-29 RX ORDER — ASPIRIN 81 MG/1
81 TABLET, CHEWABLE ORAL DAILY
Qty: 30 TABLET | Refills: 3 | Status: SHIPPED | OUTPATIENT
Start: 2020-10-30

## 2020-10-29 RX ORDER — CLOPIDOGREL BISULFATE 75 MG/1
75 TABLET ORAL DAILY
Qty: 30 TABLET | Refills: 3 | COMMUNITY
Start: 2020-10-30

## 2020-10-29 RX ORDER — ATORVASTATIN CALCIUM 80 MG/1
80 TABLET, FILM COATED ORAL NIGHTLY
Qty: 30 TABLET | Refills: 3 | COMMUNITY
Start: 2020-10-31

## 2020-10-29 RX ORDER — ATORVASTATIN CALCIUM 40 MG/1
80 TABLET, FILM COATED ORAL NIGHTLY
Status: DISCONTINUED | OUTPATIENT
Start: 2020-10-31 | End: 2020-10-29 | Stop reason: HOSPADM

## 2020-10-29 RX ADMIN — CLOPIDOGREL BISULFATE 75 MG: 75 TABLET ORAL at 09:29

## 2020-10-29 RX ADMIN — ASPIRIN 81 MG: 81 TABLET, CHEWABLE ORAL at 09:29

## 2020-10-29 RX ADMIN — ENOXAPARIN SODIUM 40 MG: 40 INJECTION SUBCUTANEOUS at 09:29

## 2020-10-29 RX ADMIN — Medication 10 ML: at 09:39

## 2020-10-29 RX ADMIN — ATORVASTATIN CALCIUM 80 MG: 40 TABLET, FILM COATED ORAL at 09:41

## 2020-10-29 ASSESSMENT — PAIN SCALES - GENERAL
PAINLEVEL_OUTOF10: 0
PAINLEVEL_OUTOF10: 0

## 2020-10-29 NOTE — CARE COORDINATION
10/29/2020 Direct call placed to Star Lee with the [facility] EMERGENCY [facility]- explained we are trying to transfer Mr. Cian Medeiros to their [facility] location due to Neurology recommendations. Pt with Basal Artery Thrombosis. Cm following.  Electronically signed by Jose Us RN-BC on 10/29/2020 at 8:51 AM

## 2020-10-29 NOTE — CARE COORDINATION
10/29/2020 Wilfred 48, [address] [place], [place], [facility]: [phone] arrived 228 pm to  patient for transfer to [facility]. Electronically signed by Andrey Pan RN-BC on 10/29/2020 at 2:28 PM

## 2020-10-29 NOTE — CONSULTS
History Of Present Illness: CHIEF COMPLAINT:  Numbness/slurred speech     HISTORY OF PRESENT ILLNESS:    Patient is a 48-year-old male who presented to the ED due to right-sided numbness and visual changes. States he has intermittent right-sided numbness for the last month. He was evaluated at the [address] [place] [address] in [place] with CT scan of the brain. He was told he had multiple sclerosis. States however that episodes became more frequent and he had associated blurry vision. He also noticed slurred speech and that is why he came in today. He continues to smoke. He does take medication for his blood pressure. He has borderline diabetes which he controls with the medication. As above per hospitalist.  Patient is interviewed. He has a right hand or who has had intermittent bilateral symptoms of numbness and weakness of all 4 extremities more so on the right. Yesterday he had facial droop with dysarthria that was associated with ataxia that resolved. This morning he feels pretty much close to his normal baseline. The patient is a 45 y.o. male with significant past medical history of type 2 diabetes and hypertension and dyslipidemia who presents with above. The patient has the following symptoms:    Change in level of consciousness: alert    New Weakness: no    Numbness or Tingling: yes    Difficulty Swallowing: no    Current Medications:   Scheduled Meds:   sodium chloride flush  10 mL Intravenous 2 times per day    enoxaparin  40 mg Subcutaneous Daily    clopidogrel  75 mg Oral Daily    aspirin  81 mg Oral Daily     Continuous Infusions:   dextrose       PRN Meds:sodium chloride flush, acetaminophen **OR** acetaminophen, polyethylene glycol, glucose, dextrose, glucagon (rDNA), dextrose, perflutren lipid microspheres    Allergies:  Patient has no known allergies. Social History:   TOBACCO:   reports that he has been smoking cigarettes. He has a 30.00 pack-year smoking history.  He has never used smokeless

## 2020-10-29 NOTE — DISCHARGE INSTR - COC
Continuity of Care Form    Patient Name: Jamar Guerra   :  1970  MRN:  [id number]    Admit date:  10/28/2020  Discharge date:  ***    Code Status Order: Full Code   Advance Directives:   [facility] Documentation     Date/Time Healthcare Directive Type of Healthcare Directive Copy in [address] Agent's Name Healthcare Agent's Phone Number    10/28/20 2049  No, patient does not have an advance directive for healthcare treatment -- -- -- -- --          Admitting Physician:  Marilin Houser DO  PCP: Mila Jarvis MD    Discharging Nurse: [facility] Unit/Room#: 4736/0973-52  Discharging Unit Phone Number: ***    Emergency Contact:   Extended Emergency Contact Information  Primary Emergency Contact: Gabriella Colindres  Address: [address] [place] [address]  Home Phone: [phone]  Relation: Parent   needed? No  Secondary Emergency Contact: abbie cooper  Home Phone: [phone]  Relation: Niece/Nephew   needed? No    Past Surgical History:  Past Surgical History:   Procedure Laterality Date    HAND SURGERY         Immunization History: There is no immunization history on file for this patient.     Active Problems:  Patient Active Problem List   Diagnosis Code    Depression F32.9    HTN (hypertension) I10    Obesity E66.9    Tobacco abuse Z72.0    Hyperlipidemia E78.5    PTSD (post-traumatic stress disorder) F43.10    Acute CVA (cerebrovascular accident) ([facility] [address] I63.9    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       Isolation/Infection:   Isolation          No Isolation        Patient Infection Status     Infection Onset Added Last Indicated Last Indicated By Review Planned Expiration Resolved Resolved By    None active    Resolved    COVID-19 Rule Out 10/29/20 10/29/20 10/29/20 COVID-19 (Ordered)   10/29/20 Rule-Out Test Resulted          Nurse Assessment:  Last Vital Signs: /86   Pulse 89   Temp 97.9 °F (36.6 °C) (Oral)   Resp 20   Ht 6' 4\" (1.93 m)   Wt (!) 306 lb 7 oz (139 kg)   SpO2 91%   BMI 37.30 kg/m²     Last documented pain score (0-10 scale): Pain Level: 0  Last Weight:   Wt Readings from Last 1 Encounters:   10/28/20 (!) 306 lb 7 oz (139 kg)     Mental Status:  {IP PT MENTAL STATUS:94649}    IV Access:  { VALARIE IV ACCESS:059708593}    Nursing Mobility/ADLs:  Walking   {CHP DME HTFS:132123078}  Transfer  {CHP DME TIPU:580284078}  Bathing  {CHP DME GDZD:900507171}  Dressing  {CHP DME ESN}  Toileting  {CHP DME OFJU:783022867}  Feeding  {CHP DME YTHI:319508450}  Med Admin  {P DME WCGN:176667352}  Med Delivery   { VALARIE MED Delivery:574136219}    Wound Care Documentation and Therapy:        Elimination:  Continence:   · Bowel: {YES / ML:31165}  · Bladder: {YES / OL:93667}  Urinary Catheter: {Urinary Catheter:739362606}   Colostomy/Ileostomy/Ileal Conduit: {YES / VJ:44893}       Date of Last BM: ***  No intake or output data in the 24 hours ending 10/29/20 1233  No intake/output data recorded.     Safety Concerns:     [address] [employer] Safety Concerns:368611321}    Impairments/Disabilities:      [address] [employer] Impairments/Disabilities:885884156}    Nutrition Therapy:  Current Nutrition Therapy:   [employer] Diet List:866852633}    Routes of Feeding: {P DME Other Feedings:061585373}  Liquids: {Slp liquid thickness:42174}  Daily Fluid Restriction: {CHP DME Yes amt example:167697421}  Last Modified Barium Swallow with Video (Video Swallowing Test): {Done Not Done WBEK:219819456}    Treatments at the Time of Hospital Discharge:   Respiratory Treatments: ***  Oxygen Therapy:  {Therapy; copd oxygen:36626}  Ventilator:    { CC Vent ZRWS:049149914}    Rehab Therapies: {THERAPEUTIC INTERVENTION:7672729789}  Weight Bearing Status/Restrictions: [address] AB[address] Weight Bearin}  Other Medical Equipment (for information only, NOT a DME order):  {EQUIPMENT:916192239}  Other Treatments: ***    Patient's personal belongings (please select all that are sent with patient):  {CHP DME Belongings:413986240}    RN SIGNATURE:  {Esignature:062739638}    CASE MANAGEMENT/SOCIAL WORK SECTION    Inpatient Status Date: ***    Readmission Risk Assessment Score:  Readmission Risk              Risk of Unplanned Readmission:        8           Discharging to Facility/ Agency   · Name:   · Address:  · Phone:  · Fax:    Dialysis Facility (if applicable)   · Name:  · Address:  · Dialysis Schedule:  · Phone:  · Fax:    / signature: {Esignature:649699812}    PHYSICIAN SECTION    Prognosis: {Prognosis:2363694557}    Condition at Discharge: [address] [place] [address] Patient Condition:356200467}    Rehab Potential (if transferring to Rehab): {Prognosis:4092293687}    Recommended Labs or Other Treatments After Discharge: ***    Physician Certification: I certify the above information and transfer of Harjeet Cardenas  is necessary for the continuing treatment of the diagnosis listed and that he requires {Admit to Appropriate Level of Care:98968} for {GREATER/LESS:170821305} 30 days.      Update Admission H&P: {CHP DME Changes in EYWOQ:705941728}    PHYSICIAN SIGNATURE:  {Esignature:845968964}

## 2020-10-29 NOTE — PROGRESS NOTES
Paged Dr. Benjamin Hess in regards to Dr. Gurpreet Sykes requesting patient to be transferred to [facility].

## 2020-10-29 NOTE — CARE COORDINATION
10/29/2020 [facility] of PCU- pt needs to transfer to [facility]- ICU (likely needed per [facility]). [facility] to [facility] ext [phone] and she assigned CM Kash Mirza at ext [phone] fax: [phone] all clinical information faxed to Kash Mriza via epic. Pt is 60% service connected and has travel benefits. Requesting an immediate transfer to [facility] for [facility]. Pt has no other insurance coverage. SS/CM following.  Electronically signed by FRAN Amaya on 10/29/2020 at 8:21 AM

## 2020-10-29 NOTE — CARE COORDINATION
10/29/2020 pt has been accepted for transfer to [facility]-  form needs signed by patient and returned per Radha Resendiz at the [place]. Stat Covid negative results faxed to Radha Resendiz as requested. They are arranging transportation and bed assignment pending. Dr. Anna Button called CM to update me on the requests for Discs of any imaging that must be sent with patient. Angela Carrillo working on this. Cm following.  Electronically signed by Raysa Green RN-BC on 10/29/2020 at 11:11 AM

## 2020-10-29 NOTE — PROGRESS NOTES
Occupational Therapy  OT SESSION ATTEMPT     Date:10/29/2020  Patient Name: Harjeet Cardenas  MRN: [id number]  : 1970  Room: [facility]-     Attempted OT session this date:    [] unavailable due to other medical staff currently with pt   [] on hold per nursing staff   [] on hold per nursing staff secondary to lab / radiology results    [] pt declined due to ____. Benefits of participation in therapy reviewed with pt.    [] off unit   [x] Other: Plans for emergent transfer to [address] [place] [address] due to basilar thrombosis/    Will reattempt OT evaluation and/or treatment if pt remains at [facility].      Ximena Baker OTR/L #[id number]

## 2020-10-29 NOTE — CARE COORDINATION
10/29/2020 No Covid testing. Cm transition of care:  Pt seen in PCU. He lives in a single story home alone. He is a - of the [employer]. Dayanna Morris for his service. Bora Antonio is not working at this time. He has no dme and no oxygen at home. Immediate plans for transfer to [facility]- due to basal artery thrombosis. Arrangements being made through the [address] [place] [address] at ext [phone]/faxed all information to . Call placed to [facility]- waiting on a return call for further directions for transfer. Call to [facility] - spoke to Patti Amaya- for ACLS- transportation, cardiac monitor, Will Call ticket number: [phone]. Pt is 60% service connected and has travel benefits per Claudette at the [facility]. Cm to follow.  Electronically signed by FRAN Bowman on 10/29/2020 at 9:11 AM

## 2020-10-29 NOTE — DISCHARGE SUMMARY
Department of Internal Medicine  History and Physical    PCP: Dr. Robby Fernandez   Admitting Physician: Dr. Ana Chandler  Consultants:       CHIEF COMPLAINT:  Numbness/slurred speech    HISTORY OF PRESENT ILLNESS:    Patient is a 59-year-old male who presented to the ED due to right-sided numbness and visual changes. States he has intermittent right-sided numbness for the last month. He was evaluated at the [place] in [place] with CT scan of the brain. He was told he had multiple sclerosis. States however that episodes became more frequent and he had associated blurry vision. He also noticed slurred speech and that is why he came in today. He continues to smoke. He does take medication for his blood pressure. He has borderline diabetes which he controls with the medication. 10/29/2020  Patient seen and examined on PICU. Patient's only complaint is some numbness in his right hand. Denies any problem with chest pain, abdominal pain, nausea/vomiting, dizziness or blurred vision at this time. Neurology note reviewed. Reviewed MRI, CT scans, lab work with patient. Patient BMP essentially normal with a fasting blood sugar goal of 164. Lipid panel shows LDL of 133 with an HDL 33 and triglycerides 394. Patient's erythrocytosis on admission with hemoglobin 18.4 is improved and currently 16.7. Patient's temperature is 97.9 with heart rate 89 blood pressure currently 137/86. O2 sats 91% on room air. Patient's lung sounds are coarse but no wheezing or rhonchi with heart regular. Patient will be transferred to [facility] today per ambulance.     Patient stable for discharge/transfer    PAST MEDICAL Hx:  Past Medical History:   Diagnosis Date    Acute CVA (cerebrovascular accident) ([address] 10/28/2020    Anxiety     Depression     Headache(784.0)     Hypertension     Narcolepsy     Obesity     PTSD (post-traumatic stress disorder)     Tinnitus     Tobacco abuse     Unspecified sleep apnea        PAST SURGICAL Hx:   Past Surgical History:   Procedure Laterality Date    HAND SURGERY         FAMILY Hx:  No family history on file. HOME MEDICATIONS:  Prior to Admission medications    Medication Sig Start Date End Date Taking? Authorizing Provider   hydrochlorothiazide (HYDRODIURIL) 25 MG tablet Take 12.5 mg by mouth daily. Every other day   Yes Historical Provider, MD   losartan (COZAAR) 25 MG tablet Take 25 mg by mouth daily. Takes every other day   Yes Historical Provider, MD   ibuprofen (ADVIL;MOTRIN) 800 MG tablet Take 1 tablet by mouth every 8 hours as needed for Pain 7/6/20   Corky Velarde MD       ALLERGIES:  Patient has no known allergies.     SOCIAL Hx:  Social History     Socioeconomic History    Marital status: Single     Spouse name: Not on file    Number of children: Not on file    Years of education: Not on file    Highest education level: Not on file   Occupational History    Not on file   Social Needs    Financial resource strain: Not on file    Food insecurity     Worry: Not on file     Inability: Not on file    Transportation needs     Medical: Not on file     Non-medical: Not on file   Tobacco Use    Smoking status: Current Every Day Smoker     Packs/day: 1.50     Years: 20.00     Pack years: 30.00     Types: Cigarettes    Smokeless tobacco: Never Used   Substance and Sexual Activity    Alcohol use: No    Drug use: No    Sexual activity: Not on file   Lifestyle    Physical activity     Days per week: Not on file     Minutes per session: Not on file    Stress: Not on file   Relationships    Social connections     Talks on phone: Not on file     Gets together: Not on file     Attends Spiritism service: Not on file     Active member of club or organization: Not on file     Attends meetings of clubs or organizations: Not on file     Relationship status: Not on file    Intimate partner violence     Fear of current or ex partner: Not on file     Emotionally abused: Not on file     Physically abused: Not auscultation bilaterally, no wheezes, rhonchi, or rales,     CARDIOVASCULAR:    Normal apical impulse, regular rate and rhythm, normal S1 and S2, no S3 or S4, and no murmur noted    ABDOMEN:    No scars, normal bowel sounds, soft, non-distended, non-tender, no masses palpated, no hepatosplenomegaly, no rebound or guarding elicited on palpation     MUSCULOSKELETAL:    There is no redness, warmth, or swelling of the joints. Full range of motion noted. Motor strength is 5 out of 5 all extremities bilaterally. Tone is normal.    NEUROLOGIC:    Awake, alert, oriented to name, place and time. Cranial nerves II-XII are grossly intact. Motor is 5 out of 5 bilaterally. SKIN:    No bruising or bleeding. No redness, warmth, or swelling    EXTREMITIES:    Peripheral pulses present. No edema, cyanosis, or swelling. OSTEOPATHIC:    Examined in seated and supine positions. Normal thoracic kyphosis and lumbar lordosis. No acute somatic dysfunction.     LABORATORY DATA:  CBC with Differential:    Lab Results   Component Value Date    WBC 11.1 10/29/2020    RBC 5.25 10/29/2020    HGB 16.7 10/29/2020    HCT 51.2 10/29/2020     10/29/2020    MCV 97.5 10/29/2020    MCH 31.8 10/29/2020    MCHC 32.6 10/29/2020    RDW 13.6 10/29/2020    SEGSPCT 47 01/17/2012    SEGSPCT 64 10/20/2010    LYMPHOPCT 19.2 10/28/2020    MONOPCT 8.4 10/28/2020    EOSPCT 4 10/20/2010    BASOPCT 0.4 10/28/2020    MONOSABS 1.10 10/28/2020    LYMPHSABS 2.51 10/28/2020    EOSABS 0.22 10/28/2020    BASOSABS 0.05 10/28/2020     CMP:    Lab Results   Component Value Date     10/29/2020    K 4.1 10/29/2020    K 4.2 10/28/2020    CL 96 10/29/2020    CO2 29 10/29/2020    BUN 14 10/29/2020    CREATININE 0.9 10/29/2020    GFRAA >60 10/29/2020    LABGLOM >60 10/29/2020    GLUCOSE 137 10/29/2020    GLUCOSE 98 01/17/2012    PROT 7.3 10/29/2020    LABALBU 4.3 10/29/2020    LABALBU 4.6 01/17/2012    CALCIUM 9.5 10/29/2020    BILITOT 0.6 10/29/2020 ALKPHOS 77 10/29/2020    AST 44 10/29/2020    ALT 52 10/29/2020       ASSESSMENT/PLAN:  1. Acute CVA-multiple sites  1. CTA showed moderate stenosis of cavernous segments of the ICA bilaterally. Showed moderate stenosis and focal thrombus of basilar artery. MRI of the brain shows small acute infarct right parietal lobe with additional punctate acute infarcts in the right occipital lobe, bilateral el, and questionable left cerebellum. Started on aspirin and Plavix. PT OT consulted. 2. 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 Case was discussed with interventional radiologist.  Due to improvement in symptoms and risk of surgery it was decided that he would not be a candidate for intervention at this time. They recommended medical management on admission. 3. History of sleep apnea  1. On BiPAP  4. Current tobacco abuse with COPD  5. Severe obesity  6. Hypertension  7. Depression and anxiety  8. Narcolepsy  9.  Erythrocytosis-secondary to tobacco abuse    Plan:   [facility] [place] and patient will be [facility] afternoon  Lovenox 40 mg subcu daily  Plavix 75 mg daily  Enteric-coated 81 mg aspirin daily  Lipitor 80 mg p.o. daily    Continue Cozaar/hydrochlorothiazide      Shona Chandler D.O.  12:14 PM  10/29/2020

## 2020-10-30 NOTE — CARE COORDINATION
10/30/2020 additional information sent to [facility] as requested by Be Pollack.  Electronically signed by Mark Banegas RN-BC on 10/30/2020 at 7:43 AM

## 2021-01-06 ENCOUNTER — APPOINTMENT (OUTPATIENT)
Dept: CT IMAGING | Age: 51
DRG: 329 | End: 2021-01-06
Payer: OTHER GOVERNMENT

## 2021-01-06 ENCOUNTER — HOSPITAL ENCOUNTER (INPATIENT)
Age: 51
LOS: 4 days | Discharge: HOME | DRG: 329 | End: 2021-01-11
Attending: EMERGENCY MEDICINE | Admitting: INTERNAL MEDICINE
Payer: OTHER GOVERNMENT

## 2021-01-06 DIAGNOSIS — K46.0 INCARCERATED HERNIA: ICD-10-CM

## 2021-01-06 DIAGNOSIS — K56.609 SBO (SMALL BOWEL OBSTRUCTION) (HCC): Primary | ICD-10-CM

## 2021-01-06 DIAGNOSIS — G89.18 POSTOPERATIVE PAIN: ICD-10-CM

## 2021-01-06 LAB
ALBUMIN SERPL-MCNC: 4.7 G/DL (ref 3.5–5.2)
ALP BLD-CCNC: 70 U/L (ref 40–129)
ALT SERPL-CCNC: 26 U/L (ref 0–40)
ANION GAP SERPL CALCULATED.3IONS-SCNC: 14 MMOL/L (ref 7–16)
AST SERPL-CCNC: 23 U/L (ref 0–39)
BASOPHILS ABSOLUTE: 0.06 E9/L (ref 0–0.2)
BASOPHILS RELATIVE PERCENT: 0.5 % (ref 0–2)
BILIRUB SERPL-MCNC: 0.7 MG/DL (ref 0–1.2)
BUN BLDV-MCNC: 26 MG/DL (ref 6–20)
CALCIUM SERPL-MCNC: 9.9 MG/DL (ref 8.6–10.2)
CHLORIDE BLD-SCNC: 96 MMOL/L (ref 98–107)
CO2: 26 MMOL/L (ref 22–29)
CREAT SERPL-MCNC: 1 MG/DL (ref 0.7–1.2)
EOSINOPHILS ABSOLUTE: 0.19 E9/L (ref 0.05–0.5)
EOSINOPHILS RELATIVE PERCENT: 1.5 % (ref 0–6)
GFR AFRICAN AMERICAN: >60
GFR NON-AFRICAN AMERICAN: >60 ML/MIN/1.73
GLUCOSE BLD-MCNC: 168 MG/DL (ref 74–99)
HCT VFR BLD CALC: 47.7 % (ref 37–54)
HEMOGLOBIN: 16.1 G/DL (ref 12.5–16.5)
IMMATURE GRANULOCYTES #: 0.06 E9/L
IMMATURE GRANULOCYTES %: 0.5 % (ref 0–5)
LIPASE: 64 U/L (ref 13–60)
LYMPHOCYTES ABSOLUTE: 2.53 E9/L (ref 1.5–4)
LYMPHOCYTES RELATIVE PERCENT: 20 % (ref 20–42)
MCH RBC QN AUTO: 31.7 PG (ref 26–35)
MCHC RBC AUTO-ENTMCNC: 33.8 % (ref 32–34.5)
MCV RBC AUTO: 93.9 FL (ref 80–99.9)
MONOCYTES ABSOLUTE: 1 E9/L (ref 0.1–0.95)
MONOCYTES RELATIVE PERCENT: 7.9 % (ref 2–12)
NEUTROPHILS ABSOLUTE: 8.78 E9/L (ref 1.8–7.3)
NEUTROPHILS RELATIVE PERCENT: 69.6 % (ref 43–80)
PDW BLD-RTO: 13.3 FL (ref 11.5–15)
PLATELET # BLD: 181 E9/L (ref 130–450)
PMV BLD AUTO: 10.3 FL (ref 7–12)
POTASSIUM SERPL-SCNC: 3.8 MMOL/L (ref 3.5–5)
RBC # BLD: 5.08 E12/L (ref 3.8–5.8)
SODIUM BLD-SCNC: 136 MMOL/L (ref 132–146)
TOTAL PROTEIN: 7.9 G/DL (ref 6.4–8.3)
WBC # BLD: 12.6 E9/L (ref 4.5–11.5)

## 2021-01-06 PROCEDURE — 83690 ASSAY OF LIPASE: CPT

## 2021-01-06 PROCEDURE — 87040 BLOOD CULTURE FOR BACTERIA: CPT

## 2021-01-06 PROCEDURE — 2580000003 HC RX 258: Performed by: EMERGENCY MEDICINE

## 2021-01-06 PROCEDURE — 99285 EMERGENCY DEPT VISIT HI MDM: CPT

## 2021-01-06 PROCEDURE — 6360000002 HC RX W HCPCS: Performed by: EMERGENCY MEDICINE

## 2021-01-06 PROCEDURE — 80053 COMPREHEN METABOLIC PANEL: CPT

## 2021-01-06 PROCEDURE — 85025 COMPLETE CBC W/AUTO DIFF WBC: CPT

## 2021-01-06 PROCEDURE — 96374 THER/PROPH/DIAG INJ IV PUSH: CPT

## 2021-01-06 PROCEDURE — 36415 COLL VENOUS BLD VENIPUNCTURE: CPT

## 2021-01-06 PROCEDURE — 74176 CT ABD & PELVIS W/O CONTRAST: CPT

## 2021-01-06 PROCEDURE — 83605 ASSAY OF LACTIC ACID: CPT

## 2021-01-06 RX ORDER — MORPHINE SULFATE 10 MG/ML
8 INJECTION, SOLUTION INTRAMUSCULAR; INTRAVENOUS ONCE
Status: DISCONTINUED | OUTPATIENT
Start: 2021-01-06 | End: 2021-01-11 | Stop reason: HOSPADM

## 2021-01-06 RX ORDER — 0.9 % SODIUM CHLORIDE 0.9 %
1000 INTRAVENOUS SOLUTION INTRAVENOUS ONCE
Status: DISCONTINUED | OUTPATIENT
Start: 2021-01-06 | End: 2021-01-11 | Stop reason: HOSPADM

## 2021-01-06 RX ORDER — ONDANSETRON 2 MG/ML
4 INJECTION INTRAMUSCULAR; INTRAVENOUS ONCE
Status: COMPLETED | OUTPATIENT
Start: 2021-01-06 | End: 2021-01-06

## 2021-01-06 RX ADMIN — ONDANSETRON 4 MG: 2 INJECTION INTRAMUSCULAR; INTRAVENOUS at 22:39

## 2021-01-06 ASSESSMENT — PAIN DESCRIPTION - LOCATION: LOCATION: ABDOMEN

## 2021-01-06 ASSESSMENT — ENCOUNTER SYMPTOMS
VOMITING: 0
NAUSEA: 0
ABDOMINAL PAIN: 1
CHEST TIGHTNESS: 0
DIARRHEA: 0
SHORTNESS OF BREATH: 0

## 2021-01-06 ASSESSMENT — PAIN DESCRIPTION - PAIN TYPE: TYPE: ACUTE PAIN

## 2021-01-07 ENCOUNTER — ANESTHESIA (OUTPATIENT)
Dept: OPERATING ROOM | Age: 51
DRG: 329 | End: 2021-01-07
Payer: OTHER GOVERNMENT

## 2021-01-07 ENCOUNTER — APPOINTMENT (OUTPATIENT)
Dept: GENERAL RADIOLOGY | Age: 51
DRG: 329 | End: 2021-01-07
Payer: OTHER GOVERNMENT

## 2021-01-07 ENCOUNTER — ANESTHESIA EVENT (OUTPATIENT)
Dept: OPERATING ROOM | Age: 51
DRG: 329 | End: 2021-01-07
Payer: OTHER GOVERNMENT

## 2021-01-07 VITALS
DIASTOLIC BLOOD PRESSURE: 100 MMHG | SYSTOLIC BLOOD PRESSURE: 142 MMHG | RESPIRATION RATE: 3 BRPM | OXYGEN SATURATION: 75 %

## 2021-01-07 PROBLEM — K56.609 SBO (SMALL BOWEL OBSTRUCTION) (HCC): Status: ACTIVE | Noted: 2021-01-07

## 2021-01-07 LAB
ALBUMIN SERPL-MCNC: 4.3 G/DL (ref 3.5–5.2)
ALP BLD-CCNC: 65 U/L (ref 40–129)
ALT SERPL-CCNC: 22 U/L (ref 0–40)
ANION GAP SERPL CALCULATED.3IONS-SCNC: 13 MMOL/L (ref 7–16)
AST SERPL-CCNC: 21 U/L (ref 0–39)
BASOPHILS ABSOLUTE: 0.06 E9/L (ref 0–0.2)
BASOPHILS RELATIVE PERCENT: 0.4 % (ref 0–2)
BILIRUB SERPL-MCNC: 0.7 MG/DL (ref 0–1.2)
BUN BLDV-MCNC: 22 MG/DL (ref 6–20)
CALCIUM SERPL-MCNC: 9.2 MG/DL (ref 8.6–10.2)
CHLORIDE BLD-SCNC: 100 MMOL/L (ref 98–107)
CHOLESTEROL, TOTAL: 95 MG/DL (ref 0–199)
CO2: 22 MMOL/L (ref 22–29)
CREAT SERPL-MCNC: 0.8 MG/DL (ref 0.7–1.2)
EOSINOPHILS ABSOLUTE: 0.04 E9/L (ref 0.05–0.5)
EOSINOPHILS RELATIVE PERCENT: 0.3 % (ref 0–6)
GFR AFRICAN AMERICAN: >60
GFR NON-AFRICAN AMERICAN: >60 ML/MIN/1.73
GLUCOSE BLD-MCNC: 129 MG/DL (ref 74–99)
HBA1C MFR BLD: 7.1 % (ref 4–5.6)
HCT VFR BLD CALC: 43.3 % (ref 37–54)
HDLC SERPL-MCNC: 33 MG/DL
HEMOGLOBIN: 14.7 G/DL (ref 12.5–16.5)
IMMATURE GRANULOCYTES #: 0.06 E9/L
IMMATURE GRANULOCYTES %: 0.4 % (ref 0–5)
LACTIC ACID, SEPSIS: 1.6 MMOL/L (ref 0.5–1.9)
LACTIC ACID, SEPSIS: 3.1 MMOL/L (ref 0.5–1.9)
LACTIC ACID: 2.6 MMOL/L (ref 0.5–2.2)
LDL CHOLESTEROL CALCULATED: 34 MG/DL (ref 0–99)
LYMPHOCYTES ABSOLUTE: 2.36 E9/L (ref 1.5–4)
LYMPHOCYTES RELATIVE PERCENT: 16.6 % (ref 20–42)
MAGNESIUM: 1.9 MG/DL (ref 1.6–2.6)
MCH RBC QN AUTO: 32.3 PG (ref 26–35)
MCHC RBC AUTO-ENTMCNC: 33.9 % (ref 32–34.5)
MCV RBC AUTO: 95.2 FL (ref 80–99.9)
METER GLUCOSE: 151 MG/DL (ref 74–99)
METER GLUCOSE: 154 MG/DL (ref 74–99)
MONOCYTES ABSOLUTE: 1.31 E9/L (ref 0.1–0.95)
MONOCYTES RELATIVE PERCENT: 9.2 % (ref 2–12)
NEUTROPHILS ABSOLUTE: 10.41 E9/L (ref 1.8–7.3)
NEUTROPHILS RELATIVE PERCENT: 73.1 % (ref 43–80)
PDW BLD-RTO: 13.5 FL (ref 11.5–15)
PLATELET # BLD: 164 E9/L (ref 130–450)
PMV BLD AUTO: 10 FL (ref 7–12)
POTASSIUM SERPL-SCNC: 3.9 MMOL/L (ref 3.5–5)
RBC # BLD: 4.55 E12/L (ref 3.8–5.8)
SARS-COV-2, NAAT: NOT DETECTED
SODIUM BLD-SCNC: 135 MMOL/L (ref 132–146)
TOTAL PROTEIN: 7.2 G/DL (ref 6.4–8.3)
TRIGL SERPL-MCNC: 141 MG/DL (ref 0–149)
TSH SERPL DL<=0.05 MIU/L-ACNC: 1.32 UIU/ML (ref 0.27–4.2)
VLDLC SERPL CALC-MCNC: 28 MG/DL
WBC # BLD: 14.2 E9/L (ref 4.5–11.5)

## 2021-01-07 PROCEDURE — 3600000014 HC SURGERY LEVEL 4 ADDTL 15MIN: Performed by: SURGERY

## 2021-01-07 PROCEDURE — 49587 REPAIR UMBILICAL HERN,5+Y/O,STRANG: CPT | Performed by: SURGERY

## 2021-01-07 PROCEDURE — 2500000003 HC RX 250 WO HCPCS: Performed by: INTERNAL MEDICINE

## 2021-01-07 PROCEDURE — 6360000002 HC RX W HCPCS

## 2021-01-07 PROCEDURE — 6360000002 HC RX W HCPCS: Performed by: SURGERY

## 2021-01-07 PROCEDURE — 2580000003 HC RX 258

## 2021-01-07 PROCEDURE — 2500000003 HC RX 250 WO HCPCS: Performed by: SURGERY

## 2021-01-07 PROCEDURE — 44120 REMOVAL OF SMALL INTESTINE: CPT | Performed by: SURGERY

## 2021-01-07 PROCEDURE — 83036 HEMOGLOBIN GLYCOSYLATED A1C: CPT

## 2021-01-07 PROCEDURE — 2709999900 HC NON-CHARGEABLE SUPPLY: Performed by: SURGERY

## 2021-01-07 PROCEDURE — 2580000003 HC RX 258: Performed by: INTERNAL MEDICINE

## 2021-01-07 PROCEDURE — 7100000000 HC PACU RECOVERY - FIRST 15 MIN: Performed by: SURGERY

## 2021-01-07 PROCEDURE — 2720000010 HC SURG SUPPLY STERILE: Performed by: SURGERY

## 2021-01-07 PROCEDURE — 83735 ASSAY OF MAGNESIUM: CPT

## 2021-01-07 PROCEDURE — 71045 X-RAY EXAM CHEST 1 VIEW: CPT

## 2021-01-07 PROCEDURE — 88302 TISSUE EXAM BY PATHOLOGIST: CPT

## 2021-01-07 PROCEDURE — 3600000004 HC SURGERY LEVEL 4 BASE: Performed by: SURGERY

## 2021-01-07 PROCEDURE — 0WQF4ZZ REPAIR ABDOMINAL WALL, PERCUTANEOUS ENDOSCOPIC APPROACH: ICD-10-PCS | Performed by: SURGERY

## 2021-01-07 PROCEDURE — 87040 BLOOD CULTURE FOR BACTERIA: CPT

## 2021-01-07 PROCEDURE — 80053 COMPREHEN METABOLIC PANEL: CPT

## 2021-01-07 PROCEDURE — 88307 TISSUE EXAM BY PATHOLOGIST: CPT

## 2021-01-07 PROCEDURE — 1200000000 HC SEMI PRIVATE

## 2021-01-07 PROCEDURE — 6370000000 HC RX 637 (ALT 250 FOR IP): Performed by: SURGERY

## 2021-01-07 PROCEDURE — 94660 CPAP INITIATION&MGMT: CPT

## 2021-01-07 PROCEDURE — 85025 COMPLETE CBC W/AUTO DIFF WBC: CPT

## 2021-01-07 PROCEDURE — 6360000002 HC RX W HCPCS: Performed by: ANESTHESIOLOGY

## 2021-01-07 PROCEDURE — 82962 GLUCOSE BLOOD TEST: CPT

## 2021-01-07 PROCEDURE — 99254 IP/OBS CNSLTJ NEW/EST MOD 60: CPT | Performed by: SURGERY

## 2021-01-07 PROCEDURE — 7100000001 HC PACU RECOVERY - ADDTL 15 MIN: Performed by: SURGERY

## 2021-01-07 PROCEDURE — 0DB80ZZ EXCISION OF SMALL INTESTINE, OPEN APPROACH: ICD-10-PCS | Performed by: SURGERY

## 2021-01-07 PROCEDURE — 83605 ASSAY OF LACTIC ACID: CPT

## 2021-01-07 PROCEDURE — 6360000002 HC RX W HCPCS: Performed by: INTERNAL MEDICINE

## 2021-01-07 PROCEDURE — 84443 ASSAY THYROID STIM HORMONE: CPT

## 2021-01-07 PROCEDURE — 80061 LIPID PANEL: CPT

## 2021-01-07 PROCEDURE — 6360000004 HC RX CONTRAST MEDICATION: Performed by: RADIOLOGY

## 2021-01-07 PROCEDURE — 2500000003 HC RX 250 WO HCPCS

## 2021-01-07 PROCEDURE — 3700000001 HC ADD 15 MINUTES (ANESTHESIA): Performed by: SURGERY

## 2021-01-07 PROCEDURE — 36415 COLL VENOUS BLD VENIPUNCTURE: CPT

## 2021-01-07 PROCEDURE — U0002 COVID-19 LAB TEST NON-CDC: HCPCS

## 2021-01-07 PROCEDURE — 3700000000 HC ANESTHESIA ATTENDED CARE: Performed by: SURGERY

## 2021-01-07 PROCEDURE — C1781 MESH (IMPLANTABLE): HCPCS | Performed by: SURGERY

## 2021-01-07 RX ORDER — ONDANSETRON 2 MG/ML
4 INJECTION INTRAMUSCULAR; INTRAVENOUS
Status: DISCONTINUED | OUTPATIENT
Start: 2021-01-07 | End: 2021-01-07

## 2021-01-07 RX ORDER — SODIUM CHLORIDE AND POTASSIUM CHLORIDE .9; .15 G/100ML; G/100ML
SOLUTION INTRAVENOUS CONTINUOUS
Status: DISCONTINUED | OUTPATIENT
Start: 2021-01-07 | End: 2021-01-10

## 2021-01-07 RX ORDER — TRAMADOL HYDROCHLORIDE 50 MG/1
50 TABLET ORAL EVERY 4 HOURS PRN
Status: DISCONTINUED | OUTPATIENT
Start: 2021-01-07 | End: 2021-01-11 | Stop reason: HOSPADM

## 2021-01-07 RX ORDER — BUPIVACAINE HYDROCHLORIDE AND EPINEPHRINE 2.5; 5 MG/ML; UG/ML
INJECTION, SOLUTION EPIDURAL; INFILTRATION; INTRACAUDAL; PERINEURAL PRN
Status: DISCONTINUED | OUTPATIENT
Start: 2021-01-07 | End: 2021-01-07 | Stop reason: ALTCHOICE

## 2021-01-07 RX ORDER — ASPIRIN 81 MG/1
81 TABLET, CHEWABLE ORAL DAILY
Status: DISCONTINUED | OUTPATIENT
Start: 2021-01-07 | End: 2021-01-11 | Stop reason: HOSPADM

## 2021-01-07 RX ORDER — ACETAMINOPHEN 500 MG
500 TABLET ORAL EVERY 6 HOURS PRN
Status: DISCONTINUED | OUTPATIENT
Start: 2021-01-07 | End: 2021-01-11 | Stop reason: HOSPADM

## 2021-01-07 RX ORDER — DEXTROSE MONOHYDRATE 50 MG/ML
100 INJECTION, SOLUTION INTRAVENOUS PRN
Status: DISCONTINUED | OUTPATIENT
Start: 2021-01-07 | End: 2021-01-11 | Stop reason: HOSPADM

## 2021-01-07 RX ORDER — MIDAZOLAM HYDROCHLORIDE 1 MG/ML
INJECTION INTRAMUSCULAR; INTRAVENOUS PRN
Status: DISCONTINUED | OUTPATIENT
Start: 2021-01-07 | End: 2021-01-07 | Stop reason: SDUPTHER

## 2021-01-07 RX ORDER — ONDANSETRON 2 MG/ML
INJECTION INTRAMUSCULAR; INTRAVENOUS PRN
Status: DISCONTINUED | OUTPATIENT
Start: 2021-01-07 | End: 2021-01-07 | Stop reason: SDUPTHER

## 2021-01-07 RX ORDER — CLOPIDOGREL BISULFATE 75 MG/1
75 TABLET ORAL DAILY
Status: DISCONTINUED | OUTPATIENT
Start: 2021-01-07 | End: 2021-01-11 | Stop reason: HOSPADM

## 2021-01-07 RX ORDER — NICOTINE POLACRILEX 4 MG
15 LOZENGE BUCCAL PRN
Status: DISCONTINUED | OUTPATIENT
Start: 2021-01-07 | End: 2021-01-11 | Stop reason: HOSPADM

## 2021-01-07 RX ORDER — MORPHINE SULFATE 2 MG/ML
INJECTION, SOLUTION INTRAMUSCULAR; INTRAVENOUS
Status: COMPLETED
Start: 2021-01-07 | End: 2021-01-07

## 2021-01-07 RX ORDER — MORPHINE SULFATE 4 MG/ML
4 INJECTION, SOLUTION INTRAMUSCULAR; INTRAVENOUS EVERY 4 HOURS PRN
Status: DISCONTINUED | OUTPATIENT
Start: 2021-01-07 | End: 2021-01-11 | Stop reason: HOSPADM

## 2021-01-07 RX ORDER — SODIUM CHLORIDE 9 MG/ML
INJECTION, SOLUTION INTRAVENOUS CONTINUOUS PRN
Status: DISCONTINUED | OUTPATIENT
Start: 2021-01-07 | End: 2021-01-07 | Stop reason: SDUPTHER

## 2021-01-07 RX ORDER — PROPOFOL 10 MG/ML
INJECTION, EMULSION INTRAVENOUS PRN
Status: DISCONTINUED | OUTPATIENT
Start: 2021-01-07 | End: 2021-01-07 | Stop reason: SDUPTHER

## 2021-01-07 RX ORDER — HYDROCHLOROTHIAZIDE 12.5 MG/1
12.5 TABLET ORAL DAILY
Status: DISCONTINUED | OUTPATIENT
Start: 2021-01-07 | End: 2021-01-11 | Stop reason: HOSPADM

## 2021-01-07 RX ORDER — SUCCINYLCHOLINE CHLORIDE 20 MG/ML
INJECTION INTRAMUSCULAR; INTRAVENOUS PRN
Status: DISCONTINUED | OUTPATIENT
Start: 2021-01-07 | End: 2021-01-07 | Stop reason: SDUPTHER

## 2021-01-07 RX ORDER — MORPHINE SULFATE 2 MG/ML
2 INJECTION, SOLUTION INTRAMUSCULAR; INTRAVENOUS EVERY 5 MIN PRN
Status: DISCONTINUED | OUTPATIENT
Start: 2021-01-07 | End: 2021-01-07

## 2021-01-07 RX ORDER — MORPHINE SULFATE 2 MG/ML
INJECTION, SOLUTION INTRAMUSCULAR; INTRAVENOUS
Status: DISPENSED
Start: 2021-01-07 | End: 2021-01-08

## 2021-01-07 RX ORDER — GLYCOPYRROLATE 0.2 MG/ML
INJECTION INTRAMUSCULAR; INTRAVENOUS PRN
Status: DISCONTINUED | OUTPATIENT
Start: 2021-01-07 | End: 2021-01-07 | Stop reason: SDUPTHER

## 2021-01-07 RX ORDER — PROMETHAZINE HYDROCHLORIDE 25 MG/ML
6.25 INJECTION, SOLUTION INTRAMUSCULAR; INTRAVENOUS
Status: DISCONTINUED | OUTPATIENT
Start: 2021-01-07 | End: 2021-01-07

## 2021-01-07 RX ORDER — NEOSTIGMINE METHYLSULFATE 1 MG/ML
INJECTION, SOLUTION INTRAVENOUS PRN
Status: DISCONTINUED | OUTPATIENT
Start: 2021-01-07 | End: 2021-01-07 | Stop reason: SDUPTHER

## 2021-01-07 RX ORDER — MEPERIDINE HYDROCHLORIDE 25 MG/ML
12.5 INJECTION INTRAMUSCULAR; INTRAVENOUS; SUBCUTANEOUS EVERY 5 MIN PRN
Status: DISCONTINUED | OUTPATIENT
Start: 2021-01-07 | End: 2021-01-07

## 2021-01-07 RX ORDER — DEXTROSE MONOHYDRATE 25 G/50ML
12.5 INJECTION, SOLUTION INTRAVENOUS PRN
Status: DISCONTINUED | OUTPATIENT
Start: 2021-01-07 | End: 2021-01-11 | Stop reason: HOSPADM

## 2021-01-07 RX ORDER — KETOROLAC TROMETHAMINE 30 MG/ML
15 INJECTION, SOLUTION INTRAMUSCULAR; INTRAVENOUS EVERY 6 HOURS PRN
Status: DISCONTINUED | OUTPATIENT
Start: 2021-01-07 | End: 2021-01-07

## 2021-01-07 RX ORDER — ROCURONIUM BROMIDE 10 MG/ML
INJECTION, SOLUTION INTRAVENOUS PRN
Status: DISCONTINUED | OUTPATIENT
Start: 2021-01-07 | End: 2021-01-07 | Stop reason: SDUPTHER

## 2021-01-07 RX ORDER — ATORVASTATIN CALCIUM 40 MG/1
80 TABLET, FILM COATED ORAL NIGHTLY
Status: DISCONTINUED | OUTPATIENT
Start: 2021-01-07 | End: 2021-01-11 | Stop reason: HOSPADM

## 2021-01-07 RX ORDER — TRAMADOL HYDROCHLORIDE 50 MG/1
100 TABLET ORAL EVERY 6 HOURS PRN
Status: DISCONTINUED | OUTPATIENT
Start: 2021-01-07 | End: 2021-01-11 | Stop reason: HOSPADM

## 2021-01-07 RX ORDER — FENTANYL CITRATE 50 UG/ML
INJECTION, SOLUTION INTRAMUSCULAR; INTRAVENOUS PRN
Status: DISCONTINUED | OUTPATIENT
Start: 2021-01-07 | End: 2021-01-07 | Stop reason: SDUPTHER

## 2021-01-07 RX ORDER — LOSARTAN POTASSIUM 50 MG/1
25 TABLET ORAL DAILY
Status: DISCONTINUED | OUTPATIENT
Start: 2021-01-07 | End: 2021-01-11 | Stop reason: HOSPADM

## 2021-01-07 RX ORDER — ASPIRIN 81 MG/1
81 TABLET, CHEWABLE ORAL DAILY
Status: DISCONTINUED | OUTPATIENT
Start: 2021-01-07 | End: 2021-01-07 | Stop reason: SDUPTHER

## 2021-01-07 RX ORDER — LIDOCAINE HYDROCHLORIDE 20 MG/ML
INJECTION, SOLUTION INFILTRATION; PERINEURAL PRN
Status: DISCONTINUED | OUTPATIENT
Start: 2021-01-07 | End: 2021-01-07 | Stop reason: SDUPTHER

## 2021-01-07 RX ADMIN — MORPHINE SULFATE 2 MG: 2 INJECTION, SOLUTION INTRAMUSCULAR; INTRAVENOUS at 11:39

## 2021-01-07 RX ADMIN — INSULIN LISPRO 1 UNITS: 100 INJECTION, SOLUTION INTRAVENOUS; SUBCUTANEOUS at 21:42

## 2021-01-07 RX ADMIN — ROCURONIUM BROMIDE 20 MG: 10 SOLUTION INTRAVENOUS at 09:43

## 2021-01-07 RX ADMIN — SODIUM CHLORIDE: 9 INJECTION, SOLUTION INTRAVENOUS at 10:35

## 2021-01-07 RX ADMIN — LIDOCAINE HYDROCHLORIDE 60 MG: 20 INJECTION, SOLUTION INFILTRATION; PERINEURAL at 09:37

## 2021-01-07 RX ADMIN — ROCURONIUM BROMIDE 10 MG: 10 SOLUTION INTRAVENOUS at 09:52

## 2021-01-07 RX ADMIN — METRONIDAZOLE 500 MG: 500 INJECTION, SOLUTION INTRAVENOUS at 04:10

## 2021-01-07 RX ADMIN — TRAMADOL HYDROCHLORIDE 100 MG: 50 TABLET, FILM COATED ORAL at 21:40

## 2021-01-07 RX ADMIN — POTASSIUM CHLORIDE AND SODIUM CHLORIDE: 900; 150 INJECTION, SOLUTION INTRAVENOUS at 16:11

## 2021-01-07 RX ADMIN — ROCURONIUM BROMIDE 10 MG: 10 SOLUTION INTRAVENOUS at 09:57

## 2021-01-07 RX ADMIN — HYDROMORPHONE HYDROCHLORIDE 0.5 MG: 1 INJECTION, SOLUTION INTRAMUSCULAR; INTRAVENOUS; SUBCUTANEOUS at 11:57

## 2021-01-07 RX ADMIN — PROPOFOL 200 MG: 10 INJECTION, EMULSION INTRAVENOUS at 09:37

## 2021-01-07 RX ADMIN — MORPHINE SULFATE 2 MG: 2 INJECTION, SOLUTION INTRAMUSCULAR; INTRAVENOUS at 11:27

## 2021-01-07 RX ADMIN — TRAMADOL HYDROCHLORIDE 50 MG: 50 TABLET, FILM COATED ORAL at 14:10

## 2021-01-07 RX ADMIN — ONDANSETRON 4 MG: 2 INJECTION INTRAMUSCULAR; INTRAVENOUS at 10:49

## 2021-01-07 RX ADMIN — PHENYLEPHRINE HYDROCHLORIDE 100 MCG: 10 INJECTION INTRAVENOUS at 10:03

## 2021-01-07 RX ADMIN — ROCURONIUM BROMIDE 10 MG: 10 SOLUTION INTRAVENOUS at 09:37

## 2021-01-07 RX ADMIN — ATORVASTATIN CALCIUM 80 MG: 40 TABLET, FILM COATED ORAL at 21:40

## 2021-01-07 RX ADMIN — ROCURONIUM BROMIDE 10 MG: 10 SOLUTION INTRAVENOUS at 10:31

## 2021-01-07 RX ADMIN — MORPHINE SULFATE 2 MG: 2 INJECTION, SOLUTION INTRAMUSCULAR; INTRAVENOUS at 12:16

## 2021-01-07 RX ADMIN — FENTANYL CITRATE 50 MCG: 50 INJECTION, SOLUTION INTRAMUSCULAR; INTRAVENOUS at 10:31

## 2021-01-07 RX ADMIN — MIDAZOLAM 2 MG: 1 INJECTION INTRAMUSCULAR; INTRAVENOUS at 09:29

## 2021-01-07 RX ADMIN — METRONIDAZOLE 500 MG: 500 INJECTION, SOLUTION INTRAVENOUS at 21:39

## 2021-01-07 RX ADMIN — CEFTRIAXONE SODIUM 1 G: 1 INJECTION, POWDER, FOR SOLUTION INTRAMUSCULAR; INTRAVENOUS at 04:10

## 2021-01-07 RX ADMIN — INSULIN LISPRO 2 UNITS: 100 INJECTION, SOLUTION INTRAVENOUS; SUBCUTANEOUS at 16:52

## 2021-01-07 RX ADMIN — Medication 3 MG: at 10:56

## 2021-01-07 RX ADMIN — POTASSIUM CHLORIDE AND SODIUM CHLORIDE: 900; 150 INJECTION, SOLUTION INTRAVENOUS at 04:10

## 2021-01-07 RX ADMIN — METRONIDAZOLE 500 MG: 500 INJECTION, SOLUTION INTRAVENOUS at 14:04

## 2021-01-07 RX ADMIN — GLYCOPYRROLATE 0.6 MG: 0.2 INJECTION, SOLUTION INTRAMUSCULAR; INTRAVENOUS at 10:56

## 2021-01-07 RX ADMIN — IOHEXOL 50 ML: 240 INJECTION, SOLUTION INTRATHECAL; INTRAVASCULAR; INTRAVENOUS; ORAL at 00:24

## 2021-01-07 RX ADMIN — FENTANYL CITRATE 100 MCG: 50 INJECTION, SOLUTION INTRAMUSCULAR; INTRAVENOUS at 09:59

## 2021-01-07 RX ADMIN — SODIUM CHLORIDE: 9 INJECTION, SOLUTION INTRAVENOUS at 09:29

## 2021-01-07 RX ADMIN — FENTANYL CITRATE 100 MCG: 50 INJECTION, SOLUTION INTRAMUSCULAR; INTRAVENOUS at 09:37

## 2021-01-07 RX ADMIN — Medication 0.5 MG: at 11:57

## 2021-01-07 RX ADMIN — SUCCINYLCHOLINE CHLORIDE 140 MG: 20 INJECTION, SOLUTION INTRAMUSCULAR; INTRAVENOUS at 09:37

## 2021-01-07 ASSESSMENT — PULMONARY FUNCTION TESTS
PIF_VALUE: 24
PIF_VALUE: 19
PIF_VALUE: 19
PIF_VALUE: 24
PIF_VALUE: 22
PIF_VALUE: 24
PIF_VALUE: 25
PIF_VALUE: 1
PIF_VALUE: 2
PIF_VALUE: 30
PIF_VALUE: 26
PIF_VALUE: 24
PIF_VALUE: 16
PIF_VALUE: 27
PIF_VALUE: 0
PIF_VALUE: 23
PIF_VALUE: 26
PIF_VALUE: 27
PIF_VALUE: 23
PIF_VALUE: 24
PIF_VALUE: 27
PIF_VALUE: 16
PIF_VALUE: 24
PIF_VALUE: 19
PIF_VALUE: 23
PIF_VALUE: 27
PIF_VALUE: 28
PIF_VALUE: 19
PIF_VALUE: 18
PIF_VALUE: 28
PIF_VALUE: 2
PIF_VALUE: 25
PIF_VALUE: 25
PIF_VALUE: 1
PIF_VALUE: 24
PIF_VALUE: 19
PIF_VALUE: 23
PIF_VALUE: 28
PIF_VALUE: 21
PIF_VALUE: 23
PIF_VALUE: 24
PIF_VALUE: 0
PIF_VALUE: 23
PIF_VALUE: 1
PIF_VALUE: 28
PIF_VALUE: 1
PIF_VALUE: 27
PIF_VALUE: 21
PIF_VALUE: 19
PIF_VALUE: 25
PIF_VALUE: 1
PIF_VALUE: 18
PIF_VALUE: 5

## 2021-01-07 ASSESSMENT — PAIN DESCRIPTION - DESCRIPTORS
DESCRIPTORS: ACHING;DISCOMFORT
DESCRIPTORS: PRESSURE
DESCRIPTORS: ACHING;DISCOMFORT

## 2021-01-07 ASSESSMENT — PAIN DESCRIPTION - ORIENTATION
ORIENTATION: MID
ORIENTATION: LOWER
ORIENTATION: MID
ORIENTATION: MID

## 2021-01-07 ASSESSMENT — PAIN SCALES - GENERAL
PAINLEVEL_OUTOF10: 6
PAINLEVEL_OUTOF10: 5
PAINLEVEL_OUTOF10: 0
PAINLEVEL_OUTOF10: 4
PAINLEVEL_OUTOF10: 3
PAINLEVEL_OUTOF10: 6

## 2021-01-07 ASSESSMENT — PAIN DESCRIPTION - PAIN TYPE
TYPE: ACUTE PAIN;SURGICAL PAIN
TYPE: ACUTE PAIN;SURGICAL PAIN
TYPE: SURGICAL PAIN
TYPE: SURGICAL PAIN
TYPE: ACUTE PAIN;SURGICAL PAIN

## 2021-01-07 ASSESSMENT — PAIN DESCRIPTION - LOCATION
LOCATION: ABDOMEN

## 2021-01-07 NOTE — ED NOTES
Patient refuses Morphine for pain; pain rated 3/10. Patient encouraged to alert staff to change in pain location, intensity, or character. Patient verbalizes understanding.       Dalton Gilmore RN  01/06/21 3445

## 2021-01-07 NOTE — ANESTHESIA POSTPROCEDURE EVALUATION
Department of Anesthesiology  Postprocedure Note    Patient: Ricardo Bowens  MRN: [id number]  YOB: 1970  Date of evaluation: 1/7/2021  Time:  4:23 PM     Procedure Summary     Date: 01/07/21 Room / Location: [address] [place] [address] / [address] [place] [address]    Anesthesia Start: 3308 Anesthesia Stop: 1113    Procedure: LAPAROSCOPIC POSS OPEN REPAIR INCARERATED INCISIONAL HERNIAsmall bowel resection (N/A ) Diagnosis: (hernia)    Surgeons: Syed Bower MD Responsible Provider: Carmen Zepeda MD    Anesthesia Type: general ASA Status: 3          Anesthesia Type: general    Tania Phase I: Tania Score: 9    Tania Phase II:      Last vitals: Reviewed and per EMR flowsheets.        Anesthesia Post Evaluation    Patient location during evaluation: PACU  Patient participation: complete - patient participated  Level of consciousness: awake  Airway patency: patent  Nausea & Vomiting: no nausea and no vomiting  Complications: no  Cardiovascular status: hemodynamically stable  Respiratory status: acceptable  Hydration status: euvolemic

## 2021-01-07 NOTE — OP NOTE
Operative Note      Patient: Abdias Miranda  YOB: 1970  MRN: [id number]    Date of Procedure: 1/7/2021    Pre-Op Diagnosis: Incarcerated strangulated umbilical hernia, small bowel obstruction, coagulopathy    Post-Op Diagnosis: Incarcerated strangulated umbilical hernia, small bowel obstruction, Meckel's diverticulum, coagulopathy       Procedure(s):  Laparoscopic repair of incarcerated strangulated umbilical hernia with small bowel resection, partial omentectomy    Surgeon(s):  Quincy Bentley MD    Assistant:   First Assistant: aNdeen Joseph    Anesthesia: General    Estimated Blood Loss (mL): 17GN    Complications: None    Specimens:   ID Type Source Tests Collected by Time Destination   A : hernia sac and contents,omentum Tissue Tissue SURGICAL PATHOLOGY Quincy Bentley MD 1/7/2021 1003    B : jujenum,epidivuticulum Tissue Tissue SURGICAL PATHOLOGY Quincy Bentley MD 1/7/2021 1047        Implants:  * No implants in log *      Drains:   NG/OG/NJ/NE Tube Nasogastric 16 fr Right nostril (Active)   Surrounding Skin Intact 01/07/21 0140   Securement device Yes 01/07/21 0410   Status Suction-low intermittent 01/07/21 0410   Placement Verified by X-Ray (Initial) 01/07/21 0140   Drainage Appearance Karolyn Ruth 01/07/21 0410       Findings: Strangulated small bowel obstruction with Meckel's diverticulum    Detailed Description of Procedure:   Is a 58-year-old male who presented with a known umbilical hernia that acutely became incarcerated. He had evidence of strangulation and small bowel obstruction on CT scan. He was consented after being explained risk benefits and alternatives of procedure agreed to proceed he is taken the operating placed upon administered general anesthesia and intubated. Once he was adequately sedated was prepped and draped in normal sterile fashion. I initially made a curvilinear incision on the right side of the umbilicus over the area of the palpable hernia defect.   I used electrocautery dissect down through the subcutaneous tissue until identified the hernia sac. Hernia sac was large and distended consistent with an incarcerated sac I was able to enter into the sac sharply and then identified omentum intact within the sac. Immediately did not appreciate any bowel within the hernia sac and appeared that it may have been reduced. I resected the entire sac including a portion of the omentum using the LigaSure device. I then inserted a 5 mm laparoscopic trocar and insufflated to 15 mmHg. The hernia defect measured approximately 2 cm in diameter was very small necked. I then inserted another 5 mm trocar in the left upper quadrant under direct visualization. I inserted another 5 mm trocar in the left lateral abdomen. Inserted a camera to follow upon inspection the abdomen I still could appreciate some omentum adhered to the superior aspect of the umbilical defect. I did not immediately appreciate any bowel involvement with it. Electrocautery was then used to take down the adhesion of the omentum to the anterior abdominal wall. I then closed the midline defect using a #1 PDS suture. Running fashion after inserting a piece of 4-1/2 inch round ventral light ST mesh. I then tacked it to the abdominal wall. Prior to completing the procedure I ran the small bowel starting from the ligament of Treitz and running distally immediately identified an area of transition with hemorrhagic small bowel that it. To be the source of the obstruction of what was previously incarcerated in the hernia. As I ran more distally immediately could appreciate a small Meckel's diverticulum coursing off the side of it that had a very large mesenteric supply to it. As I move more distal the bowel appeared nonviable as there is areas of significant hemorrhagic changes to the serosa and the adjacent mesentery. This may be concerned the viability of the bowel therefore elected to resect it.   I then remove counts and needle counts were correct at completion of the procedure.     Electronically signed by Jack Ma MD on 1/7/2021 at 11:00 AM

## 2021-01-07 NOTE — CONSULTS
General Surgery History and Physical      Patient's Name/Date of Birth: Cecilio Medellin / 1970    Date: January 7, 2021     PCP: Maria Teresa Luna MD     Chief Complaint: Abdominal hernia    HPI:   Cecilio Medellin is a 45 y.o. male who presents for evaluation of a ventral hernia. Timing is >2 years, but incarcerated since 6 pm last night, radiation to umbilicus, alleviated by nothing and started suddenly and severity is 7/10. he has history of no previous abdominal surgeries. he has no history of previous repairs of the hernia. Denies nausea, vomiting, fever, chills, SOB, chest pain, diarrheal constipation. No history of abnormal colonoscopy. They are a former smoker. His last BM was yesterday    He has a history of CVA 10/2020 and takes ASA/plavix. His last Echocardiogram was essentially normal.      Patient Active Problem List   Diagnosis    Depression    HTN (hypertension)    Obesity    Tobacco abuse    Hyperlipidemia    PTSD (post-traumatic stress disorder)    Acute CVA (cerebrovascular accident) ([address]    Basilar artery thrombosis    SBO (small bowel obstruction) ([address]       Past Medical History:   Diagnosis Date    Acute CVA (cerebrovascular accident) ([address] 10/28/2020    Anxiety     Depression     Headache(784.0)     Hypertension     Narcolepsy     Obesity     PTSD (post-traumatic stress disorder)     Tinnitus     Tobacco abuse     Unspecified sleep apnea        Past Surgical History:   Procedure Laterality Date    HAND SURGERY         No Known Allergies    The patient has a family history that is negative for severe cardiovascular or respiratory issues, negative for reaction to anesthesia. Time spent reviewing past medical, surgical, social and family history, vitals, nursing assessment and images. No changes from above documented history.     Social History     Socioeconomic History    Marital status: Single     Spouse name: Not on file    Number of children: Not on file    Years of consciousness, murmur or orthopnea  Gastrointestinal ROS: negative for - hematochezia or hematemesis  Genito-Urinary ROS: negative for -  genital discharge or hematuria  Musculoskeletal ROS: negative for - focal weakness, gangrene  Psych/Neuro ROS: negative for - visual or auditory hallucinations, suicidal ideation    Physical exam:   /85   Pulse 86   Temp 98.1 °F (36.7 °C) (Oral)   Resp 18   Ht 6' 4\" (1.93 m)   Wt (!) 306 lb (138.8 kg)   SpO2 92%   BMI 37.25 kg/m²   General appearance:  NAD, appears stated age  Head: NCAT, PERRLA, EOMI, red conjunctiva  Neck: supple, no masses, trachea midline  Lungs: Equal chest rise bilateral, no retractions, no wheezing  Heart: Reg rate  Abdomen: soft, nondistended, obese, incarcerated ventral hernia with no overlying skin chnages, mild tenderness on palpation  Skin; warm and dry, no cyanosis  Gu: no cva tenderness  Extremities: atraumatic, no focal motor deficits, no open wounds  Psych: No tremor, visual hallucinations        Radiology: I reviewed relevant abdominal imaging from this admission and that available in the EMR including CT abd/pel from 1/6.  My assessment is SBO secondary to ventral hernia    Assessment:  Joe Self is a 45 y.o. male with incarcerated, obstructing ventral hernia  Patient Active Problem List   Diagnosis    Depression    HTN (hypertension)    Obesity    Tobacco abuse    Hyperlipidemia    PTSD (post-traumatic stress disorder)    Acute CVA (cerebrovascular accident) ([address]    Basilar artery thrombosis    [address] ([facility] bowel obstruction) [address]         Plan:  OR for Laparoscopic possible open ventral hernia repair with possible mesh, possible bowel resection.  -smoking cessation reinforced  -continue prophylactic antibiotics  Discussed the risk, benefits and alternatives of surgery including wound infections, bleeding, scar, seroma, mesh infection, mesh removal and migration and recurrent hernia formation and the risks of general anesthetic including MI, CVA, sudden death or reactions to anesthetic medications. The patient understands the risks and alternatives and the possibility of converting to an open procedure. All questions were answered to the patient's satisfaction and they freely signed the consent. Allie Cedeño MD  7:38 AM  1/7/2021     General Surgery Consult Note  Linwood Strange MD, MS    Patient's Name/Date of Birth: Ricardo Bowens / 1970    Date: January 7, 2021     Surgeon: Sara Soria MD    Requesting Physician:     Chief Complaint: Abdominal pain nausea, incarcerated umbilical hernia    Patient Active Problem List   Diagnosis    Depression    HTN (hypertension)    Obesity    Tobacco abuse    Hyperlipidemia    PTSD (post-traumatic stress disorder)    Acute CVA (cerebrovascular accident) ([address]    Basilar artery thrombosis    SBO (small bowel obstruction) ([address]       Subjective: As above. I saw and examined the patient and discussed the above HPI with the resident and agree with documented positive and negative findings. Patient with a history of an umbilical hernia for many years. History of recent CVA in October for which she has been placed on aspirin and Plavix. He still has some residual weakness on the right side but very minimal.  He has since stopped smoking and is had significant improvement in his blood pressure control. States that over the last 2 days has not been able to reduce his hernia which is normally reducible. He admits some obstipation but still intermittently passing flatus and last bowel movement was yesterday.   He has pain on exam.    Objective:  BP (!) 138/95   Pulse 94   Temp 98.1 °F (36.7 °C) (Oral)   Resp 18   Ht 6' 4\" (1.93 m)   Wt (!) 306 lb (138.8 kg)   SpO2 93%   BMI 37.25 kg/m²   Labs:  Reviewed by me and relevant abnormalities noted       A complete 10 system review was performed and are otherwise negative unless mentioned in the

## 2021-01-07 NOTE — CARE COORDINATION
1/7/2021 0955 CM note: COVID (-) 1/7/2021. Attempted to meet with patient at the bedside; however patient off the unit to OR. Attempted to call pts mom, Sumit DICKINSON. CM will attempt to f/u with patient later today.  Electronically signed by Kellie Ye RN on 1/7/2021 at 9:57 AM

## 2021-01-07 NOTE — PROGRESS NOTES
Patient received iodinated IV contrast (iohexol) on 1/7/21 at 00:24 and has met parameters to hold Metformin for 48 hours. Metformin will be restarted if the Serum Creatinine is within published guidelines. Metformin should be restarted on 1/9/21 at 08:00 pending Serum Creatinine results. If patient is discharged prior to 48 hours, MetFORMIN will need to be addressed as part of the med reconciliation process.     Russel Johnson, PharmD, 9/4/953010:87 AM

## 2021-01-07 NOTE — ANESTHESIA PRE PROCEDURE
Department of Anesthesiology  Preprocedure Note       Name:  Ricardo Bowens   Age:  45 y.o.  :  1970                                          MRN:  [id number]         Date:  2021      Surgeon: Manuel Shields):  Syed Bower MD    Procedure: Procedure(s):  LAPAROSCOPIC POSS OPEN REPAIR INCARERATED INCISIONAL HERNIA POSS BOWEL RESECTION    Medications prior to admission:   Prior to Admission medications    Medication Sig Start Date End Date Taking? Authorizing Provider   metFORMIN (GLUCOPHAGE) 500 MG tablet Take 500 mg by mouth 2 times daily (with meals)   Yes Historical Provider, MD   aspirin 81 MG chewable tablet Take 1 tablet by mouth daily 10/30/20   Luz Marina Salen, DO   enoxaparin (LOVENOX) 40 MG/0.4ML injection Inject 0.4 mLs into the skin daily 10/30/20   Luz Marina Salen, DO   atorvastatin (LIPITOR) 80 MG tablet Take 1 tablet by mouth nightly 10/31/20   Luz Marina Salen, DO   clopidogrel (PLAVIX) 75 MG tablet Take 1 tablet by mouth daily 10/30/20   Luz Marina Salen, DO   hydrochlorothiazide (HYDRODIURIL) 25 MG tablet Take 12.5 mg by mouth daily. Every other day    Historical Provider, MD   losartan (COZAAR) 25 MG tablet Take 25 mg by mouth daily.  Takes every other day    Historical Provider, MD       Current medications:    Current Facility-Administered Medications   Medication Dose Route Frequency Provider Last Rate Last Admin    hydroCHLOROthiazide (HYDRODIURIL) tablet 12.5 mg  12.5 mg Oral Daily Luz Marina Salen, DO        losartan (COZAAR) tablet 25 mg  25 mg Oral Daily Luz Marina Salen, DO        enoxaparin (LOVENOX) injection 40 mg  40 mg Subcutaneous Daily Luz Marina Salen, DO        atorvastatin (LIPITOR) tablet 80 mg  80 mg Oral Nightly Luz Marina Salen, DO        clopidogrel (PLAVIX) tablet 75 mg  75 mg Oral Daily Luz Marina Salen, DO        metFORMIN (GLUCOPHAGE) tablet 500 mg  500 mg Oral BID WC Luz Marina Salen, DO        0.9% NaCl with KCl 20 mEq infusion   Intravenous Continuous Zina Learn,  mL/hr at 01/07/21 0410 New Bag at 01/07/21 0410    trimethobenzamide (TIGAN) injection 200 mg  200 mg Intramuscular Q6H PRN Zina Learn, DO        metronidazole (FLAGYL) 500 mg in NaCl 100 mL IVPB premix  500 mg Intravenous Q8H Zina Learn, DO   Stopped at 01/07/21 0457    acetaminophen (TYLENOL) tablet 500 mg  500 mg Oral Q6H PRN Zina Learn, DO        ketorolac (TORADOL) injection 15 mg  15 mg Intravenous Q6H PRN Zina Learn, DO        morphine injection 4 mg  4 mg Intravenous Q4H PRN Zina Learn, DO        glucose (GLUTOSE) 40 % oral gel 15 g  15 g Oral PRN Zina Learn, DO        dextrose 50 % IV solution  12.5 g Intravenous PRN Zina Learn, DO        glucagon (rDNA) injection 1 mg  1 mg Intramuscular PRN Zina Learn, DO        dextrose 5 % solution  100 mL/hr Intravenous PRN Zina Learn, DO        insulin lispro (HUMALOG) injection vial 0-12 Units  0-12 Units Subcutaneous TID WC Zina Learn, DO        insulin lispro (HUMALOG) injection vial 0-6 Units  0-6 Units Subcutaneous Nightly Zina Learn, DO        [START ON 1/8/2021] cefTRIAXone (ROCEPHIN) 2 g in sterile water 20 mL IV syringe  2 g Intravenous Q24H Destin Stallworth MD        aspirin chewable tablet 81 mg  81 mg Oral Daily Zina Learn, DO        morphine (PF) injection 8 mg  8 mg Intravenous Once Robby Pellet, DO        0.9 % sodium chloride bolus  1,000 mL Intravenous Once Robby Pellet, DO 1,000 mL/hr at 01/06/21 2357 Restarted at 01/06/21 2357       Allergies:  No Known Allergies    Problem List:    Patient Active Problem List   Diagnosis Code    Depression F32.9    HTN (hypertension) I10    Obesity E66.9    Tobacco abuse Z72.0    Hyperlipidemia E78.5    PTSD (post-traumatic stress disorder) F43.10    Acute CVA (cerebrovascular accident) ([facility] [address] I63.9    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    SBO (small bowel obstruction) ([facility] [address] K56.609       Past Medical History:        Diagnosis Date    Acute CVA (cerebrovascular accident) ([facility] [address] 10/28/2020    Anxiety     Depression     Headache(784.0)     Hypertension     Narcolepsy     Obesity     PTSD (post-traumatic stress disorder)     Tinnitus     Tobacco abuse     Unspecified sleep apnea        Past Surgical History:        Procedure Laterality Date    HAND SURGERY         Social History:    Social History     Tobacco Use    Smoking status: Former Smoker     Packs/day: 1.50     Years: 20.00     Pack years: 30.00     Types: Cigarettes     Quit date: 2019     Years since quittin.1    Smokeless tobacco: Never Used   Substance Use Topics    Alcohol use: No                                Counseling given: Not Answered      Vital Signs (Current):   Vitals:    21 0146 21 0356 21 0600 21 0824   BP: 121/73 136/86 138/85 (!) 138/95   Pulse: 84 82 86 94   Resp: 18 18 18 18   Temp: 97.3 °F (36.3 °C) 97.5 °F (36.4 °C) 98.1 °F (36.7 °C)    TempSrc: Oral Oral Oral    SpO2: 94% 93% 92% 93%   Weight:  (!) 306 lb (138.8 kg)     Height:  6' 4\" (1.93 m)                                                BP Readings from Last 3 Encounters:   21 (!) 138/95   10/29/20 (!) 146/90   20 (!) 148/84       NPO Status:                                                                                 BMI:   Wt Readings from Last 3 Encounters:   21 (!) 306 lb (138.8 kg)   10/28/20 (!) 306 lb 7 oz (139 kg)   20 (!) 330 lb (149.7 kg)     Body mass index is 37.25 kg/m².     CBC:   Lab Results   Component Value Date    WBC 14.2 2021    RBC 4.55 2021    HGB 14.7 2021    HCT 43.3 2021    MCV 95.2 2021    RDW 13.5 2021     2021       CMP:   Lab Results   Component Value Date     2021    K 3.9 2021    K 4.2 10/28/2020     2021    CO2 22 2021    BUN 22 2021    CREATININE 0.8 2021    GFRAA >60 2021 LABGLOM >60 01/07/2021    GLUCOSE 129 01/07/2021    GLUCOSE 98 01/17/2012    PROT 7.2 01/07/2021    CALCIUM 9.2 01/07/2021    BILITOT 0.7 01/07/2021    ALKPHOS 65 01/07/2021    AST 21 01/07/2021    ALT 22 01/07/2021       POC Tests: No results for input(s): POCGLU, POCNA, POCK, POCCL, POCBUN, POCHEMO, POCHCT in the last 72 hours. Coags:   Lab Results   Component Value Date    PROTIME 11.5 10/28/2020    INR 1.0 10/28/2020    APTT 27.1 10/28/2020       HCG (If Applicable): No results found for: PREGTESTUR, PREGSERUM, HCG, HCGQUANT     ABGs: No results found for: PHART, PO2ART, TDK1BJV, UPR1UJM, BEART, J2ZOWXQZ     Type & Screen (If Applicable):  No results found for: LABABO, LABRH    Drug/Infectious Status (If Applicable):  No results found for: HIV, HEPCAB    COVID-19 Screening (If Applicable):   Lab Results   Component Value Date    COVID19 Not Detected 01/07/2021         Anesthesia Evaluation  Patient summary reviewed no history of anesthetic complications:   Airway: Mallampati: III  TM distance: >3 FB   Neck ROM: full  Mouth opening: > = 3 FB Dental:          Pulmonary: breath sounds clear to auscultation  (+) sleep apnea:                             Cardiovascular:    (+) hypertension:, hyperlipidemia        Rhythm: regular  Rate: normal                    Neuro/Psych:   (+) CVA:, headaches:, psychiatric history:            GI/Hepatic/Renal:        (-) no morbid obesity      ROS comment: SBO (small bowel obstruction). Endo/Other:    (+) blood dyscrasia::., .                 Abdominal:   (+) obese,         Vascular:   + PVD, aortic or cerebral, . Anesthesia Plan      general     ASA 3       Induction: intravenous. MIPS: Postoperative opioids intended and Prophylactic antiemetics administered. Anesthetic plan and risks discussed with patient. Plan discussed with CRNA.                   Carolyn Hall MD   1/7/2021

## 2021-01-07 NOTE — ED PROVIDER NOTES
46 yo male presenting with concern about his chronic umbilical hernia. He is had for many years. He is here tonight because he says the hernia became more firm and he can no longer push it back inside his abdomen. He is lost a lot of weight following his stroke 2 months ago, he feels like he is using his abdominal muscles more now. Awake, alert, oriented x4. He has an obvious hernia but no specific peritoneal signs or rigidity or guarding. It is more firm, initial attempt to reduce it were unsuccessful. He is not having any nausea or vomiting or diarrhea. This is more sudden onset, persistent, firmness to the hernia, moderate severity, 1 day duration, associated with a known hernia for many years. History reviewed. No pertinent family history. Past Surgical History:   Procedure Laterality Date    HAND SURGERY         Review of Systems   Constitutional: Negative for diaphoresis and fever. Respiratory: Negative for chest tightness and shortness of breath. Gastrointestinal: Positive for abdominal pain. Negative for diarrhea, nausea and vomiting. Hernia     All other systems reviewed and are negative. Physical Exam  Constitutional:       General: He is not in acute distress. Appearance: He is well-developed. HENT:      Head: Normocephalic and atraumatic. Eyes:      Pupils: Pupils are equal, round, and reactive to light. Neck:      Musculoskeletal: Normal range of motion and neck supple. Thyroid: No thyromegaly. Cardiovascular:      Rate and Rhythm: Normal rate and regular rhythm. Pulmonary:      Effort: Pulmonary effort is normal. No respiratory distress. Breath sounds: Normal breath sounds. No wheezing. Abdominal:      General: There is no distension. Palpations: Abdomen is soft. There is no mass. Tenderness: There is no abdominal tenderness. There is no guarding or rebound. Hernia: A hernia is present.  Hernia is present in the umbilical area.      Comments: Firm, nontender, non-reducible   Musculoskeletal: Normal range of motion. General: No tenderness. Skin:     General: Skin is warm and dry. Findings: No erythema. Comments: Pill fingers on bilateral hands consistent with Raynaud's, nontender, nonpainful, was out in the cold weather before coming in    Strong radial pulses present bilaterally   Neurological:      Mental Status: He is alert and oriented to person, place, and time. Cranial Nerves: No cranial nerve deficit. Procedures     MDM     ED Course as of Jan 07 0633 Wed Jan 06, 2021 2259 Patient's sweaty at this time. He says he refused the pain medication. Drank contrast for the CAT scan. Complaining of some generalized discomfort. Pale in appearance, looks uncomfortable.    [SO]   u Jan 07, 2021 0052 Repeat blood pressure is 357 systolic. Patient is feeling better, he still is declining pain medication. He is updated regarding a small bowel obstruction and incarcerated hernia. Lactic acid slightly elevated at 3.1. He will be admitted to the hospital.  Will place call for surgeon.    [SO]   [facility] Vivek Diez will admit the patient.    [SO]   [id number] with the general surgeon, Dr. Pooja Lewis. He will consult on this patient. We will place an NG tube per his request.    [SO]      ED Course User Index  [SO] Isidro Negrete, DO        ED Course as of Jan 07 0633 Wed Jan 06, 2021 2259 Patient's sweaty at this time. He says he refused the pain medication. Drank contrast for the CAT scan. Complaining of some generalized discomfort. Pale in appearance, looks uncomfortable.    [SO]   u Jan 07, 2021 0052 Repeat blood pressure is 318 systolic. Patient is feeling better, he still is declining pain medication. He is updated regarding a small bowel obstruction and incarcerated hernia. Lactic acid slightly elevated at 3.1. He will be admitted to the hospital.  Will place call for surgeon. [SO]   0102 Dr. Ramana Davies will admit the patient.    [SO]   [id number] with the general surgeon, Dr. Maricel Wilson. He will consult on this patient. We will place an NG tube per his request.    [SO]      ED Course User Index  [SO] Juan Pablo Smart, DO       --------------------------------------------- PAST HISTORY ---------------------------------------------  Past Medical History:  has a past medical history of Acute CVA (cerebrovascular accident) ([facility] [address] Anxiety, Depression, Headache(784.0), Hypertension, Narcolepsy, Obesity, PTSD (post-traumatic stress disorder), Tinnitus, Tobacco abuse, and Unspecified sleep apnea. Past Surgical History:  has a past surgical history that includes Hand surgery. Social History:  reports that he quit smoking about 13 months ago. His smoking use included cigarettes. He has a 30.00 pack-year smoking history. He has never used smokeless tobacco. He reports that he does not drink alcohol or use drugs. Family History: family history is not on file. The patients home medications have been reviewed. Allergies: Patient has no known allergies.     -------------------------------------------------- RESULTS -------------------------------------------------    LABS:  Results for orders placed or performed during the hospital encounter of 01/06/21   CBC auto differential   Result Value Ref Range    WBC 12.6 (H) 4.5 - 11.5 E9/L    RBC 5.08 3.80 - 5.80 E12/L    Hemoglobin 16.1 12.5 - 16.5 g/dL    Hematocrit 47.7 37.0 - 54.0 %    MCV 93.9 80.0 - 99.9 fL    MCH 31.7 26.0 - 35.0 pg    MCHC 33.8 32.0 - 34.5 %    RDW 13.3 11.5 - 15.0 fL    Platelets 217 449 - 577 E9/L    MPV 10.3 7.0 - 12.0 fL    Neutrophils % 69.6 43.0 - 80.0 %    Immature Granulocytes % 0.5 0.0 - 5.0 %    Lymphocytes % 20.0 20.0 - 42.0 %    Monocytes % 7.9 2.0 - 12.0 %    Eosinophils % 1.5 0.0 - 6.0 %    Basophils % 0.5 0.0 - 2.0 %    Neutrophils Absolute 8.78 (H) 1.80 - 7.30 E9/L    Immature Granulocytes # 0.06 E9/L Lymphocytes Absolute 2.53 1.50 - 4.00 E9/L    Monocytes Absolute 1.00 (H) 0.10 - 0.95 E9/L    Eosinophils Absolute 0.19 0.05 - 0.50 E9/L    Basophils Absolute 0.06 0.00 - 0.20 E9/L   Comprehensive metabolic panel   Result Value Ref Range    Sodium 136 132 - 146 mmol/L    Potassium 3.8 3.5 - 5.0 mmol/L    Chloride 96 (L) 98 - 107 mmol/L    CO2 26 22 - 29 mmol/L    Anion Gap 14 7 - 16 mmol/L    Glucose 168 (H) 74 - 99 mg/dL    BUN 26 (H) 6 - 20 mg/dL    CREATININE 1.0 0.7 - 1.2 mg/dL    GFR Non-African American >60 >=60 mL/min/1.73    GFR African American >60     Calcium 9.9 8.6 - 10.2 mg/dL    Total Protein 7.9 6.4 - 8.3 g/dL    Alb 4.7 3.5 - 5.2 g/dL    Total Bilirubin 0.7 0.0 - 1.2 mg/dL    Alkaline Phosphatase 70 40 - 129 U/L    ALT 26 0 - 40 U/L    AST 23 0 - 39 U/L   Lipase   Result Value Ref Range    Lipase 64 (H) 13 - 60 U/L   Lactate, Sepsis   Result Value Ref Range    Lactic Acid, Sepsis 3.1 (H) 0.5 - 1.9 mmol/L   COVID-19   Result Value Ref Range    SARS-CoV-2, NAAT Not Detected Not Detected   CBC Auto Differential   Result Value Ref Range    WBC 14.2 (H) 4.5 - 11.5 E9/L    RBC 4.55 3.80 - 5.80 E12/L    Hemoglobin 14.7 12.5 - 16.5 g/dL    Hematocrit 43.3 37.0 - 54.0 %    MCV 95.2 80.0 - 99.9 fL    MCH 32.3 26.0 - 35.0 pg    MCHC 33.9 32.0 - 34.5 %    RDW 13.5 11.5 - 15.0 fL    Platelets 108 830 - 688 E9/L    MPV 10.0 7.0 - 12.0 fL    Neutrophils % 73.1 43.0 - 80.0 %    Immature Granulocytes % 0.4 0.0 - 5.0 %    Lymphocytes % 16.6 (L) 20.0 - 42.0 %    Monocytes % 9.2 2.0 - 12.0 %    Eosinophils % 0.3 0.0 - 6.0 %    Basophils % 0.4 0.0 - 2.0 %    Neutrophils Absolute 10.41 (H) 1.80 - 7.30 E9/L    Immature Granulocytes # 0.06 E9/L    Lymphocytes Absolute 2.36 1.50 - 4.00 E9/L    Monocytes Absolute 1.31 (H) 0.10 - 0.95 E9/L    Eosinophils Absolute 0.04 (L) 0.05 - 0.50 E9/L    Basophils Absolute 0.06 0.00 - 0.20 E9/L       RADIOLOGY:  XR CHEST ABDOMEN NG PLACEMENT   Final Result   Nasogastric tube terminates in the stomach. CT ABDOMEN PELVIS WO CONTRAST Additional Contrast? Oral   Final Result   Periumbilical ventral hernia with herniation of small bowel. Incarceration   of the herniated segment of small bowel and secondary proximal small bowel   obstruction. Increased density of the herniated fat which may reflect fat   necrosis. Large hiatal hernia. Small infrarenal abdominal aortic aneurysm with maximum transverse diameter   of approximately 3 cm. 3 year interval follow-up recommended. Report called to  license caregiver. The          ------------------------- NURSING NOTES AND VITALS REVIEWED ---------------------------  Date / Time Roomed:  1/6/2021  9:59 PM  ED Bed Assignment:  [id number]    The nursing notes within the ED encounter and vital signs as below have been reviewed. Patient Vitals for the past 24 hrs:   BP Temp Temp src Pulse Resp SpO2 Height Weight   01/07/21 0356 136/86 97.5 °F (36.4 °C) Oral 82 18 93 % 6' 4\" (1.93 m) (!) 306 lb (138.8 kg)   01/07/21 0146 121/73 97.3 °F (36.3 °C) Oral 84 18 94 % -- --   01/06/21 2205 -- -- -- 75 18 98 % -- --   01/06/21 2157 101/62 98 °F (36.7 °C) Oral -- 16 -- -- --   01/06/21 2154 -- 97.7 °F (36.5 °C) Oral -- -- -- -- --       Oxygen Saturation Interpretation: Normal    ------------------------------------------ PROGRESS NOTES ------------------------------------------  Re-evaluation(s):   Patients symptoms show no change  Repeat physical examination is not changed    Counseling:  I have spoken with the patient and discussed todays results, in addition to providing specific details for the plan of care and counseling regarding the diagnosis and prognosis. Their questions are answered at this time and they are agreeable with the plan of admission.    --------------------------------- ADDITIONAL PROVIDER NOTES ---------------------------------  Consultations:   Spoke with Dr. Joshua Amaya. Discussed case. They will admit the patient.   This patient's ED course included: a personal history and physicial examination, re-evaluation prior to disposition, multiple bedside re-evaluations and IV medications    This patient has remained hemodynamically stable during their ED course. Diagnosis:  1. SBO (small bowel obstruction) ([address]    2. Incarcerated hernia        Disposition:  Patient's disposition: Admit to med/surg floor  Patient's condition is stable.             Lorene Woodard,   01/07/21 7847

## 2021-01-07 NOTE — ED NOTES
Lost IV access. Unable to establish new access.  Dr. Stuart Owusu was notified     Carroll Mazariegos RN  01/07/21 [id number]

## 2021-01-07 NOTE — H&P
Department of Internal Medicine        CHIEF COMPLAINT: Periumbilical/abdominal pain    Reason for Admission: Incarcerated umbilical hernia with small bowel obstruction    HISTORY OF PRESENT ILLNESS:      The patient is a 45 y.o. male who presents with mid abdominal pain. Pain started since 6 PM yesterday. Patient has a history of umbilical hernia for the past 14 years but is always able to reduce the hernia. Patient was unable to reduce it started last night and his discomfort became much worse. Patient went to the ED with this CAT scan showing periumbilical ventral hernia with herniation of small bowel. Patient developed secondary proximal small bowel obstruction because of the hernia. Patient also has a large hiatal hernia. Patient has a small infrarenal abdominal aortic aneurysm with maximum diameter of 3 cm. Patient had a lactic acid 3.1 in the ED. WBC was 14.2 this morning and patient was taken to surgery early this morning. Patient is seen postop. Patient denies any problem with chest pain, unusual shortness of breath, palpitations or dizziness. Patient has expected postop discomfort. Past Medical History:    Past Medical History:   Diagnosis Date    Acute CVA (cerebrovascular accident) ([facility] [address] 10/28/2020    Anxiety     Depression     Headache(784.0)     Hypertension     Narcolepsy     Obesity     PTSD (post-traumatic stress disorder)     Tinnitus     Tobacco abuse     Unspecified sleep apnea      Past Surgical History:    Past Surgical History:   Procedure Laterality Date    HAND SURGERY         Medications Prior to Admission:    @  Prior to Admission medications    Medication Sig Start Date End Date Taking?  Authorizing Provider   metFORMIN (GLUCOPHAGE) 500 MG tablet Take 500 mg by mouth 2 times daily (with meals)   Yes Historical Provider, MD   aspirin 81 MG chewable tablet Take 1 tablet by mouth daily 10/30/20   Brittany Flor DO   enoxaparin (LOVENOX) 40 MG/0.4ML injection Inject 0.4 mLs into the skin daily 10/30/20   Lisa Small,    atorvastatin (LIPITOR) 80 MG tablet Take 1 tablet by mouth nightly 10/31/20   Lisa Small DO   clopidogrel (PLAVIX) 75 MG tablet Take 1 tablet by mouth daily 10/30/20   Lisa Small, DO   hydrochlorothiazide (HYDRODIURIL) 25 MG tablet Take 12.5 mg by mouth daily. Every other day    Historical Provider, MD   losartan (COZAAR) 25 MG tablet Take 25 mg by mouth daily. Takes every other day    Historical Provider, MD       Allergies:  Patient has no known allergies.     Social History:   Social History     Socioeconomic History    Marital status: Single     Spouse name: Not on file    Number of children: Not on file    Years of education: Not on file    Highest education level: Not on file   Occupational History    Not on file   Social Needs    Financial resource strain: Not on file    Food insecurity     Worry: Not on file     Inability: Not on file    Transportation needs     Medical: Not on file     Non-medical: Not on file   Tobacco Use    Smoking status: Former Smoker     Packs/day: 1.50     Years: 20.00     Pack years: 30.00     Types: Cigarettes     Quit date: 2019     Years since quittin.1    Smokeless tobacco: Never Used   Substance and Sexual Activity    Alcohol use: No    Drug use: No    Sexual activity: Not on file   Lifestyle    Physical activity     Days per week: Not on file     Minutes per session: Not on file    Stress: Not on file   Relationships    Social connections     Talks on phone: Not on file     Gets together: Not on file     Attends Sabianism service: Not on file     Active member of club or organization: Not on file     Attends meetings of clubs or organizations: Not on file     Relationship status: Not on file    Intimate partner violence     Fear of current or ex partner: Not on file     Emotionally abused: Not on file     Physically abused: Not on file     Forced sexual activity: Not on file   Other Topics Concern    Not on file   Social History Narrative    Not on file       Family History:   History reviewed. No pertinent family history. REVIEW OF SYSTEMS:    Gen: Patient denies any lightheadedness or dizziness. No LOC or syncope. No fevers or chills. HEENT: No earache, sore throat or nasal congestion. Resp: Denies cough, hemoptysis or sputum production. Cardiac: Denies chest pain, SOB, diaphoresis or palpitations. GI: + Mid abdominal pain.  + nausea, vomiting, diarrhea or constipation. No melena or hematochezia. : No urinary complaints, dysuria, hematuria or frequency. MSK: No extremity weakness, paralysis or paresthesias. PHYSICAL EXAM:    Vitals:  /62   Pulse 77   Temp 98.7 °F (37.1 °C) (Oral)   Resp 16   Ht 6' 4\" (1.93 m)   Wt (!) 306 lb (138.8 kg)   SpO2 93%   BMI 37.25 kg/m²     General:  This is a 45 y.o. yo male who is alert and oriented in NAD  HEENT:  Head is normocephalic and atraumatic, PERRLA, EOMI, mucus membranes moist with no pharyngeal erythema or exudate. Neck:  Supple with no carotid bruits, JVD or thyromegaly.   No cervical adenopathy  CV:  Regular rate and rhythm, no murmurs  Lungs: Coarse breath sounds to auscultation bilaterally with no wheezes, rales or rhonchi  Abdomen:  +distended, postop abdomen, bowel sounds hypoactive  Extremities:  No edema, peripheral pulses intact bilaterally  Neuro:  Cranial nerves II-XII grossly intact; motor and sensory function intact with no focal deficits  Skin:  No rashes, lesions or wounds    DATA:  CBC with Differential:    Lab Results   Component Value Date    WBC 14.2 01/07/2021    RBC 4.55 01/07/2021    HGB 14.7 01/07/2021    HCT 43.3 01/07/2021     01/07/2021    MCV 95.2 01/07/2021    MCH 32.3 01/07/2021    MCHC 33.9 01/07/2021    RDW 13.5 01/07/2021    SEGSPCT 47 01/17/2012    SEGSPCT 64 10/20/2010    LYMPHOPCT 16.6 01/07/2021    MONOPCT 9.2 01/07/2021    EOSPCT 4 10/20/2010 BASOPCT 0.4 01/07/2021    MONOSABS 1.31 01/07/2021    LYMPHSABS 2.36 01/07/2021    EOSABS 0.04 01/07/2021    BASOSABS 0.06 01/07/2021     CMP:    Lab Results   Component Value Date     01/07/2021    K 3.9 01/07/2021    K 4.2 10/28/2020     01/07/2021    CO2 22 01/07/2021    BUN 22 01/07/2021    CREATININE 0.8 01/07/2021    GFRAA >60 01/07/2021    LABGLOM >60 01/07/2021    GLUCOSE 129 01/07/2021    GLUCOSE 98 01/17/2012    PROT 7.2 01/07/2021    LABALBU 4.3 01/07/2021    LABALBU 4.6 01/17/2012    CALCIUM 9.2 01/07/2021    BILITOT 0.7 01/07/2021    ALKPHOS 65 01/07/2021    AST 21 01/07/2021    ALT 22 01/07/2021     Magnesium:    Lab Results   Component Value Date    MG 1.9 01/07/2021     Phosphorus:    Lab Results   Component Value Date    PHOS 3.5 10/29/2020     PT/INR:    Lab Results   Component Value Date    PROTIME 11.5 10/28/2020    INR 1.0 10/28/2020     Troponin:    Lab Results   Component Value Date    TROPONINI <0.01 10/28/2020     U/A:    Lab Results   Component Value Date    COLORU Yellow 10/28/2020    PROTEINU Negative 10/28/2020    PHUR 5.0 10/28/2020    CLARITYU Clear 10/28/2020    SPECGRAV <=1.005 10/28/2020    LEUKOCYTESUR Negative 10/28/2020    UROBILINOGEN 0.2 10/28/2020    BILIRUBINUR Negative 10/28/2020    BLOODU Negative 10/28/2020    GLUCOSEU Negative 10/28/2020     ABG:  No results found for: PH, PCO2, PO2, HCO3, BE, THGB, TCO2, O2SAT  HgBA1c:    Lab Results   Component Value Date    LABA1C 7.1 01/07/2021     FLP:    Lab Results   Component Value Date    TRIG 141 01/07/2021    HDL 33 01/07/2021    LDLCALC 34 01/07/2021    LABVLDL 28 01/07/2021     TSH:    Lab Results   Component Value Date    TSH 1.320 01/07/2021     IRON:  No results found for: IRON  LIPASE:    Lab Results   Component Value Date    LIPASE 64 01/06/2021       ASSESSMENT AND PLAN:      Patient Active Problem List    Diagnosis Date Noted    SBO (small bowel obstruction) ([facility] [address] 01/07/2021    Incarcerated umbilical

## 2021-01-07 NOTE — VCC REMOTE MONITORING
Spoke with Yumi aMrino in regards to six hour sepsis bundle    [address] [place] [address]  [phone]

## 2021-01-08 LAB
ANION GAP SERPL CALCULATED.3IONS-SCNC: 10 MMOL/L (ref 7–16)
BASOPHILS ABSOLUTE: 0 E9/L (ref 0–0.2)
BASOPHILS RELATIVE PERCENT: 0.4 % (ref 0–2)
BUN BLDV-MCNC: 16 MG/DL (ref 6–20)
CALCIUM SERPL-MCNC: 8.9 MG/DL (ref 8.6–10.2)
CHLORIDE BLD-SCNC: 100 MMOL/L (ref 98–107)
CO2: 26 MMOL/L (ref 22–29)
CREAT SERPL-MCNC: 0.9 MG/DL (ref 0.7–1.2)
EOSINOPHILS ABSOLUTE: 0.11 E9/L (ref 0.05–0.5)
EOSINOPHILS RELATIVE PERCENT: 0.9 % (ref 0–6)
GFR AFRICAN AMERICAN: >60
GFR NON-AFRICAN AMERICAN: >60 ML/MIN/1.73
GLUCOSE BLD-MCNC: 147 MG/DL (ref 74–99)
HCT VFR BLD CALC: 41.8 % (ref 37–54)
HEMOGLOBIN: 13.6 G/DL (ref 12.5–16.5)
LYMPHOCYTES ABSOLUTE: 1.14 E9/L (ref 1.5–4)
LYMPHOCYTES RELATIVE PERCENT: 8.7 % (ref 20–42)
MCH RBC QN AUTO: 31.6 PG (ref 26–35)
MCHC RBC AUTO-ENTMCNC: 32.5 % (ref 32–34.5)
MCV RBC AUTO: 97.2 FL (ref 80–99.9)
METER GLUCOSE: 122 MG/DL (ref 74–99)
METER GLUCOSE: 135 MG/DL (ref 74–99)
METER GLUCOSE: 136 MG/DL (ref 74–99)
METER GLUCOSE: 142 MG/DL (ref 74–99)
MONOCYTES ABSOLUTE: 0.89 E9/L (ref 0.1–0.95)
MONOCYTES RELATIVE PERCENT: 7 % (ref 2–12)
NEUTROPHILS ABSOLUTE: 10.67 E9/L (ref 1.8–7.3)
NEUTROPHILS RELATIVE PERCENT: 83.5 % (ref 43–80)
PDW BLD-RTO: 13.9 FL (ref 11.5–15)
PLATELET # BLD: 152 E9/L (ref 130–450)
PMV BLD AUTO: 10.4 FL (ref 7–12)
POTASSIUM SERPL-SCNC: 3.9 MMOL/L (ref 3.5–5)
RBC # BLD: 4.3 E12/L (ref 3.8–5.8)
SODIUM BLD-SCNC: 136 MMOL/L (ref 132–146)
WBC # BLD: 12.7 E9/L (ref 4.5–11.5)

## 2021-01-08 PROCEDURE — 97530 THERAPEUTIC ACTIVITIES: CPT

## 2021-01-08 PROCEDURE — 36415 COLL VENOUS BLD VENIPUNCTURE: CPT

## 2021-01-08 PROCEDURE — 1200000000 HC SEMI PRIVATE

## 2021-01-08 PROCEDURE — 97161 PT EVAL LOW COMPLEX 20 MIN: CPT | Performed by: PHYSICAL THERAPIST

## 2021-01-08 PROCEDURE — 94660 CPAP INITIATION&MGMT: CPT

## 2021-01-08 PROCEDURE — 2580000003 HC RX 258: Performed by: SURGERY

## 2021-01-08 PROCEDURE — 80048 BASIC METABOLIC PNL TOTAL CA: CPT

## 2021-01-08 PROCEDURE — 94640 AIRWAY INHALATION TREATMENT: CPT

## 2021-01-08 PROCEDURE — 82962 GLUCOSE BLOOD TEST: CPT

## 2021-01-08 PROCEDURE — 97165 OT EVAL LOW COMPLEX 30 MIN: CPT

## 2021-01-08 PROCEDURE — 6360000002 HC RX W HCPCS: Performed by: INTERNAL MEDICINE

## 2021-01-08 PROCEDURE — 97116 GAIT TRAINING THERAPY: CPT | Performed by: PHYSICAL THERAPIST

## 2021-01-08 PROCEDURE — 97535 SELF CARE MNGMENT TRAINING: CPT

## 2021-01-08 PROCEDURE — 85025 COMPLETE CBC W/AUTO DIFF WBC: CPT

## 2021-01-08 PROCEDURE — 6360000002 HC RX W HCPCS: Performed by: SURGERY

## 2021-01-08 PROCEDURE — 6370000000 HC RX 637 (ALT 250 FOR IP): Performed by: INTERNAL MEDICINE

## 2021-01-08 PROCEDURE — 97530 THERAPEUTIC ACTIVITIES: CPT | Performed by: PHYSICAL THERAPIST

## 2021-01-08 PROCEDURE — 2500000003 HC RX 250 WO HCPCS: Performed by: SURGERY

## 2021-01-08 PROCEDURE — 6370000000 HC RX 637 (ALT 250 FOR IP): Performed by: SURGERY

## 2021-01-08 RX ORDER — DOCUSATE SODIUM 100 MG/1
100 CAPSULE, LIQUID FILLED ORAL 2 TIMES DAILY
Qty: 30 CAPSULE | Refills: 0 | Status: SHIPPED | OUTPATIENT
Start: 2021-01-08 | End: 2021-01-23

## 2021-01-08 RX ORDER — OXYCODONE HYDROCHLORIDE AND ACETAMINOPHEN 5; 325 MG/1; MG/1
1 TABLET ORAL EVERY 6 HOURS PRN
Qty: 18 TABLET | Refills: 0 | Status: SHIPPED | OUTPATIENT
Start: 2021-01-08 | End: 2021-01-13

## 2021-01-08 RX ORDER — ONDANSETRON 4 MG/1
4 TABLET, ORALLY DISINTEGRATING ORAL EVERY 8 HOURS PRN
Qty: 20 TABLET | Refills: 1 | Status: SHIPPED | OUTPATIENT
Start: 2021-01-08

## 2021-01-08 RX ORDER — IPRATROPIUM BROMIDE AND ALBUTEROL SULFATE 2.5; .5 MG/3ML; MG/3ML
1 SOLUTION RESPIRATORY (INHALATION)
Status: DISCONTINUED | OUTPATIENT
Start: 2021-01-08 | End: 2021-01-11 | Stop reason: HOSPADM

## 2021-01-08 RX ORDER — IPRATROPIUM BROMIDE AND ALBUTEROL SULFATE 2.5; .5 MG/3ML; MG/3ML
1 SOLUTION RESPIRATORY (INHALATION) EVERY 4 HOURS PRN
Status: DISCONTINUED | OUTPATIENT
Start: 2021-01-08 | End: 2021-01-11 | Stop reason: HOSPADM

## 2021-01-08 RX ADMIN — WATER 2 G: 1 INJECTION INTRAMUSCULAR; INTRAVENOUS; SUBCUTANEOUS at 04:38

## 2021-01-08 RX ADMIN — IPRATROPIUM BROMIDE AND ALBUTEROL SULFATE 1 AMPULE: .5; 3 SOLUTION RESPIRATORY (INHALATION) at 12:47

## 2021-01-08 RX ADMIN — LOSARTAN POTASSIUM 25 MG: 50 TABLET, FILM COATED ORAL at 08:43

## 2021-01-08 RX ADMIN — TRAMADOL HYDROCHLORIDE 100 MG: 50 TABLET, FILM COATED ORAL at 18:30

## 2021-01-08 RX ADMIN — METRONIDAZOLE 500 MG: 500 INJECTION, SOLUTION INTRAVENOUS at 20:53

## 2021-01-08 RX ADMIN — ENOXAPARIN SODIUM 40 MG: 40 INJECTION SUBCUTANEOUS at 08:42

## 2021-01-08 RX ADMIN — METRONIDAZOLE 500 MG: 500 INJECTION, SOLUTION INTRAVENOUS at 05:57

## 2021-01-08 RX ADMIN — ATORVASTATIN CALCIUM 80 MG: 40 TABLET, FILM COATED ORAL at 20:52

## 2021-01-08 RX ADMIN — METRONIDAZOLE 500 MG: 500 INJECTION, SOLUTION INTRAVENOUS at 15:05

## 2021-01-08 RX ADMIN — INSULIN LISPRO 2 UNITS: 100 INJECTION, SOLUTION INTRAVENOUS; SUBCUTANEOUS at 18:26

## 2021-01-08 RX ADMIN — TRAMADOL HYDROCHLORIDE 50 MG: 50 TABLET, FILM COATED ORAL at 08:57

## 2021-01-08 RX ADMIN — POTASSIUM CHLORIDE AND SODIUM CHLORIDE: 900; 150 INJECTION, SOLUTION INTRAVENOUS at 18:20

## 2021-01-08 RX ADMIN — ASPIRIN 81 MG CHEWABLE TABLET 81 MG: 81 TABLET CHEWABLE at 08:41

## 2021-01-08 RX ADMIN — CLOPIDOGREL BISULFATE 75 MG: 75 TABLET ORAL at 08:41

## 2021-01-08 RX ADMIN — IPRATROPIUM BROMIDE AND ALBUTEROL SULFATE 1 AMPULE: .5; 3 SOLUTION RESPIRATORY (INHALATION) at 16:09

## 2021-01-08 RX ADMIN — HYDROCHLOROTHIAZIDE 12.5 MG: 12.5 TABLET ORAL at 08:42

## 2021-01-08 ASSESSMENT — PAIN SCALES - GENERAL
PAINLEVEL_OUTOF10: 4
PAINLEVEL_OUTOF10: 3
PAINLEVEL_OUTOF10: 7

## 2021-01-08 ASSESSMENT — PAIN DESCRIPTION - DESCRIPTORS: DESCRIPTORS: ACHING;DISCOMFORT;SORE;TENDER

## 2021-01-08 ASSESSMENT — PAIN DESCRIPTION - LOCATION: LOCATION: ABDOMEN

## 2021-01-08 ASSESSMENT — PAIN DESCRIPTION - PROGRESSION: CLINICAL_PROGRESSION: NOT CHANGED

## 2021-01-08 NOTE — PROGRESS NOTES
GENERAL SURGERY  DAILY PROGRESS NOTE  1/8/2021    Subjective:  No BM, flatus, nausea or vomiting, has not ambulated, anxious about recurrence risk    Objective:  /89   Pulse 105   Temp 98.3 °F (36.8 °C) (Oral)   Resp 18   Ht 6' 4\" (1.93 m)   Wt (!) 306 lb (138.8 kg)   SpO2 91%   BMI 37.25 kg/m²     GENERAL:  Laying in bed, no apparent distress  LUNGS:  No increased work of breathing, no cyanosis  CARDIOVASCULAR:  Extremities warm and well perfused,   ABDOMEN:  Soft, moderate appropriate brian incisional tenderness, moderately distended, incision c/d/i  SKIN: Warm and dry    Assessment/Plan:  45 y.o. male sp umbilical hernia primary repair with small bowel resection and partial omentectomy 1/7    -start limited liquid diet, advance once bowel function demonstrated  -encourage ambulation  -leukocytosis improved  -electrolyte repletion per primary  -pain/nausea control    Electronically signed by Shai Campbell MD on 1/8/2021 at 8:47 AM     As above  Nausea no bowel movement, no flatus  Clear liquid diet await return of bowel function  Oxygen as tolerated  Ambulate  Home once return of bowel function    Taylor Carty MD, MS  Minimally Invasive and Bariatric Surgery  [phone] (p)  1/8/2021  1:59 PM

## 2021-01-08 NOTE — PROGRESS NOTES
Physical Therapy Initial Evaluation    Room #:  4821/1755-16  Patient Name: Nirmal Lara  YOB: 1970  MRN: [id number]    Referring Provider:   Wilma Anderson MD     Date of Service: 1/8/2021    Evaluating Physical Therapist: Dona Gonzalez, PT #[id number]       Diagnosis:   SBO (small bowel obstruction) ([facility] [address] [K56.609]          Patient Active Problem List   Diagnosis    Depression    HTN (hypertension)    Obesity    Tobacco abuse    Hyperlipidemia    PTSD (post-traumatic stress disorder)    Acute CVA (cerebrovascular accident) ([address]    Basilar artery thrombosis    SBO (small bowel obstruction) ([address]    Incarcerated umbilical hernia    Meckels diverticulum    Strangulated umbilical hernia        Tentative placement recommendation: Home    Equipment recommendation: None  At this time    Prior Level of Function: Patient ambulated independently    Rehab Potential: good  for baseline    Past medical history:   Past Medical History:   Diagnosis Date    Acute CVA (cerebrovascular accident) ([facility] [address] 10/28/2020    Anxiety     Depression     Headache(784.0)     Hypertension     Narcolepsy     Obesity     PTSD (post-traumatic stress disorder)     Tinnitus     Tobacco abuse     Unspecified sleep apnea      Past Surgical History:   Procedure Laterality Date    HAND SURGERY      SMALL INTESTINE SURGERY N/A 1/7/2021    LAPAROSCOPIC POSS OPEN REPAIR INCARERATED INCISIONAL HERNIAsmall bowel resection performed by Virginia Tadeo MD at [facility] OR       Precautions:  , falls and O2 , history cva right side affected, Laparoscopic repair of incarcerated strangulated umbilical hernia with small bowel resection, partial omentectomy 1/7/2021    SUBJECTIVE:    Social history: Patient lives alone   in a ranch home  with 3 steps  to enter with [address] shower     Equipment owned: None,       [id number] [facility] Mobility Inpatient   How much difficulty turning over in bed?: A Little  How much difficulty sitting down on / standing up from a chair with arms?: A Little  How much difficulty moving from lying on back to sitting on side of bed?: A Little  How much help from another person moving to and from a bed to a chair?: A Little  How much help from another person needed to walk in hospital room?: A Little  How much help from another person for climbing 3-5 steps with a railing?: A Little  AM-PAC Inpatient Mobility Raw Score : 18  AM-PAC Inpatient T-Scale Score : 43.63  Mobility Inpatient CMS 0-100% Score: 46.58  Mobility Inpatient CMS G-Code Modifier : CK    Nursing cleared patient for PT evaluation. The admitting diagnosis and active problem list as listed above have been reviewed prior to the initiation of this evaluation. OBJECTIVE;   Initial Evaluation  Date: 1/8/2021  Treatment Date:     Short Term/ Long Term   Goals   Was pt agreeable to Eval/treatment? Yes    To be met in 2 days   Pain level   4-5/10  abdomen     Bed Mobility    Rolling: Not assessed  patient sitting Edge of bed     Scooting: Supervision    Rolling: Independent    Supine to sit:  Independent    Sit to supine: Independent    Scooting: Independent     Transfers Sit to stand: Supervision  Cues for hand placement and safety   Sit to stand: Independent     Ambulation    1 x 90 feet, 2 x 110 feet using  IV pole with Supervision     Slight decreased right DF during swing phase of gait   150 feet using  no device with Independent    Stair negotiation: ascended and descended   Not assessed       3 steps 1 rail with supervision    ROM Within functional limits        Strength BUE:  4+/5  RLE:  4-/5  LLE:  4/5   Increase strength in affected mm groups by 1/3 grade   Balance Sitting EOB:  good -  Dynamic Standing:  fair +  Sitting EOB:  good    Dynamic Standing: good       Patient is Alert & Oriented x person, place, time and situation and follows directions    Sensation:  Patient  denies numbness and tingling     Edema:  no    Endurance: improve: base of support, weight shift, weight bearing    -Endurance: Utilize Supervised activities to increase level of endurance to allow for safe functional mobility including transfers and gait  and Use graduated activities to promote good breathing techniques and provide support and education to maximize respiratory function  -Stairs: Stair training with instruction on proper technique and hand placement on rail    Patient and or family understand(s) diagnosis, prognosis, and plan of care. Frequency of treatments: Patient will be seen    daily. Time in  1135  Time out  1156    Total Treatment Time  23 minutes    Evaluation time includes thorough review of current medical information, gathering information on past medical history/social history and prior level of function, completion of standardized testing/informal observation of tasks, assessment of data, and development of Plan of care and goals.      CPT codes:  Low Complexity PT evaluation (18674)  Therapeutic activities (80419)   13 minutes  1 unit(s)  Gait Training (33786) 10 minutes 1 unit(s)    Zach Omer, PT

## 2021-01-08 NOTE — PROGRESS NOTES
Department of Internal Medicine        CHIEF COMPLAINT: Periumbilical/abdominal pain    Reason for Admission: Incarcerated umbilical hernia with small bowel obstruction    HISTORY OF PRESENT ILLNESS:      The patient is a 45 y.o. male who presents with mid abdominal pain. Pain started since 6 PM yesterday. Patient has a history of umbilical hernia for the past 14 years but is always able to reduce the hernia. Patient was unable to reduce it started last night and his discomfort became much worse. Patient went to the ED with this CAT scan showing periumbilical ventral hernia with herniation of small bowel. Patient developed secondary proximal small bowel obstruction because of the hernia. Patient also has a large hiatal hernia. Patient has a small infrarenal abdominal aortic aneurysm with maximum diameter of 3 cm. Patient had a lactic acid 3.1 in the ED. WBC was 14.2 this morning and patient was taken to surgery early this morning. Patient is seen postop. Patient denies any problem with chest pain, unusual shortness of breath, palpitations or dizziness. Patient has expected postop discomfort. 1/8/2021  Patient seen examined on general medical floor. Patient has postop discomfort. Patient also complains of some lower chest burning/pressure since surgery. Patient thinks he occasionally has some of that same discomfort at home off and on. Patient denies any known history of coronary artery disease or CHF nor has he ever had a stress test.  Patient also gets somewhat short of breath with any activity. BUN/creatinine was 16/0.9. Fasting blood sugar 136 today. WBC 12.7 with hemoglobin 13.6. Temperature is 98.3 with a heart rate of 81. Blood pressure 131/72. O2 sat currently is 91% on room air.     Past Medical History:    Past Medical History:   Diagnosis Date    Acute CVA (cerebrovascular accident) ([facility] [address] 10/28/2020    Anxiety     Depression     Headache(784.0)     Hypertension     Narcolepsy     Obesity     PTSD (post-traumatic stress disorder)     Tinnitus     Tobacco abuse     Unspecified sleep apnea      Past Surgical History:    Past Surgical History:   Procedure Laterality Date    HAND SURGERY      SMALL INTESTINE SURGERY N/A 1/7/2021    LAPAROSCOPIC POSS OPEN REPAIR INCARERATED INCISIONAL HERNIAsmall bowel resection performed by Kenan Calvo MD at [address]       Medications Prior to Admission:    @  Prior to Admission medications    Medication Sig Start Date End Date Taking? Authorizing Provider   metFORMIN (GLUCOPHAGE) 500 MG tablet Take 500 mg by mouth 2 times daily (with meals)   Yes Historical Provider, MD   aspirin 81 MG chewable tablet Take 1 tablet by mouth daily 10/30/20   Antonina Flirt, DO   enoxaparin (LOVENOX) 40 MG/0.4ML injection Inject 0.4 mLs into the skin daily 10/30/20   Antonina Flirt, DO   atorvastatin (LIPITOR) 80 MG tablet Take 1 tablet by mouth nightly 10/31/20   Antonina Flirt, DO   clopidogrel (PLAVIX) 75 MG tablet Take 1 tablet by mouth daily 10/30/20   Antonina Flirt, DO   hydrochlorothiazide (HYDRODIURIL) 25 MG tablet Take 12.5 mg by mouth daily. Every other day    Historical Provider, MD   losartan (COZAAR) 25 MG tablet Take 25 mg by mouth daily. Takes every other day    Historical Provider, MD       Allergies:  Patient has no known allergies.     Social History:   Social History     Socioeconomic History    Marital status: Single     Spouse name: Not on file    Number of children: Not on file    Years of education: Not on file    Highest education level: Not on file   Occupational History    Not on file   Social Needs    Financial resource strain: Not on file    Food insecurity     Worry: Not on file     Inability: Not on file    Transportation needs     Medical: Not on file     Non-medical: Not on file   Tobacco Use    Smoking status: Former Smoker     Packs/day: 1.50     Years: 20.00     Pack years: 30.00     Types: Cigarettes     Quit date: 2019     Years since quittin.1    Smokeless tobacco: Never Used   Substance and Sexual Activity    Alcohol use: No    Drug use: No    Sexual activity: Not on file   Lifestyle    Physical activity     Days per week: Not on file     Minutes per session: Not on file    Stress: Not on file   Relationships    Social connections     Talks on phone: Not on file     Gets together: Not on file     Attends Yarsani service: Not on file     Active member of club or organization: Not on file     Attends meetings of clubs or organizations: Not on file     Relationship status: Not on file    Intimate partner violence     Fear of current or ex partner: Not on file     Emotionally abused: Not on file     Physically abused: Not on file     Forced sexual activity: Not on file   Other Topics Concern    Not on file   Social History Narrative    Not on file       Family History:   History reviewed. No pertinent family history. REVIEW OF SYSTEMS:    Gen: Patient denies any lightheadedness or dizziness. No LOC or syncope. No fevers or chills. HEENT: No earache, sore throat or nasal congestion. Resp: Denies cough, hemoptysis or sputum production. Cardiac: Denies chest pain, SOB, diaphoresis or palpitations. GI: + Mid abdominal pain.  + nausea, vomiting, diarrhea or constipation. No melena or hematochezia. : No urinary complaints, dysuria, hematuria or frequency. MSK: No extremity weakness, paralysis or paresthesias. PHYSICAL EXAM:    Vitals:  /72   Pulse 81   Temp 98.3 °F (36.8 °C) (Oral)   Resp 16   Ht 6' 4\" (1.93 m)   Wt (!) 306 lb (138.8 kg)   SpO2 90% Comment: RN notified  BMI 37.25 kg/m²     General:  This is a 45 y.o. yo male who is alert and oriented in NAD  HEENT:  Head is normocephalic and atraumatic, PERRLA, EOMI, mucus membranes moist with no pharyngeal erythema or exudate. Neck:  Supple with no carotid bruits, JVD or thyromegaly.   No cervical adenopathy  CV: Regular rate and rhythm, no murmurs  Lungs: Coarse breath sounds to auscultation bilaterally with no wheezes, rales or rhonchi  Abdomen:  +distended, postop abdomen, bowel sounds hypoactive  Extremities:  No edema, peripheral pulses intact bilaterally  Neuro:  Cranial nerves II-XII grossly intact; motor and sensory function intact with no focal deficits  Skin:  No rashes, lesions or wounds    DATA:  CBC with Differential:    Lab Results   Component Value Date    WBC 12.7 01/08/2021    RBC 4.30 01/08/2021    HGB 13.6 01/08/2021    HCT 41.8 01/08/2021     01/08/2021    MCV 97.2 01/08/2021    MCH 31.6 01/08/2021    MCHC 32.5 01/08/2021    RDW 13.9 01/08/2021    SEGSPCT 47 01/17/2012    SEGSPCT 64 10/20/2010    LYMPHOPCT 8.7 01/08/2021    MONOPCT 7.0 01/08/2021    EOSPCT 4 10/20/2010    BASOPCT 0.4 01/08/2021    MONOSABS 0.89 01/08/2021    LYMPHSABS 1.14 01/08/2021    EOSABS 0.11 01/08/2021    BASOSABS 0.00 01/08/2021     CMP:    Lab Results   Component Value Date     01/08/2021    K 3.9 01/08/2021    K 4.2 10/28/2020     01/08/2021    CO2 26 01/08/2021    BUN 16 01/08/2021    CREATININE 0.9 01/08/2021    GFRAA >60 01/08/2021    LABGLOM >60 01/08/2021    GLUCOSE 147 01/08/2021    GLUCOSE 98 01/17/2012    PROT 7.2 01/07/2021    LABALBU 4.3 01/07/2021    LABALBU 4.6 01/17/2012    CALCIUM 8.9 01/08/2021    BILITOT 0.7 01/07/2021    ALKPHOS 65 01/07/2021    AST 21 01/07/2021    ALT 22 01/07/2021     Magnesium:    Lab Results   Component Value Date    MG 1.9 01/07/2021     Phosphorus:    Lab Results   Component Value Date    PHOS 3.5 10/29/2020     PT/INR:    Lab Results   Component Value Date    PROTIME 11.5 10/28/2020    INR 1.0 10/28/2020     Troponin:    Lab Results   Component Value Date    TROPONINI <0.01 10/28/2020     U/A:    Lab Results   Component Value Date    COLORU Yellow 10/28/2020    PROTEINU Negative 10/28/2020    PHUR 5.0 10/28/2020    CLARITYU Clear 10/28/2020    SPECGRAV <=1.005 10/28/2020    LEUKOCYTESUR Negative 10/28/2020    UROBILINOGEN 0.2 10/28/2020    BILIRUBINUR Negative 10/28/2020    BLOODU Negative 10/28/2020    GLUCOSEU Negative 10/28/2020     ABG:  No results found for: PH, PCO2, PO2, HCO3, BE, THGB, TCO2, O2SAT  HgBA1c:    Lab Results   Component Value Date    LABA1C 7.1 01/07/2021     FLP:    Lab Results   Component Value Date    TRIG 141 01/07/2021    HDL 33 01/07/2021    LDLCALC 34 01/07/2021    LABVLDL 28 01/07/2021     TSH:    Lab Results   Component Value Date    TSH 1.320 01/07/2021     IRON:  No results found for: IRON  LIPASE:    Lab Results   Component Value Date    LIPASE 64 01/06/2021       ASSESSMENT AND PLAN:      Patient Active Problem List    Diagnosis Date Noted    SBO (small bowel obstruction) ([facility] [address] 01/07/2021    Incarcerated umbilical hernia     Meckels diverticulum     Strangulated umbilical hernia     Basilar artery thrombosis 10/29/2020    Acute CVA (cerebrovascular accident) ([facility] [address] 10/28/2020    Tobacco abuse 06/25/2012    Hyperlipidemia 06/25/2012    PTSD (post-traumatic stress disorder) 06/25/2012    Depression 12/06/2010    HTN (hypertension) 12/06/2010    Obesity 12/06/2010     Impression:  1. Incarcerated strangulated umbilical hernia with small bowel obstruction  2. Hypertension  3. History of prior tobacco use  4. Obesity  5. Noninsulin-dependent diabetes mellitus type 2 with hemoglobin A1c 7.1  6. History of sleep apnea-patient compliant with BiPAP at home  7. Acute hypoxic respiratory failure postop    Plan:  Home medications reviewed  Admit to general medical floor  Monitor heart rate, blood pressure, O2 saturation  Consult general surgery  Glucoscans 4 times daily with sliding scale insulin  BiPAP 10/4 per patient request  Increase activity    DuoNeb aerosols changed to 4 times daily and every 4 hours as needed with  ECG with recurrent chest pain  Troponin  Routine labs in a.mMarialuisa Luther D.O.  1/8/2021  10:27 AM

## 2021-01-08 NOTE — PROGRESS NOTES
OCCUPATIONAL THERAPY  Initial Evaluation  Date:2021  Patient Name: Joe Self  MRN: [id number]  : 1970  ROOM #: [id number]     Referring Provider: Brittany Flor DO   Evaluating OT: Javy Chan OTR/L [id number]    Placement Recommendation: Meri Huertas DO   Recommended Adaptive Equipment: TBD at rehab     [facility]   AM-PAC Daily Activity Inpatient   How much help for putting on and taking off regular lower body clothing?: A Lot  How much help for Bathing?: A Lot  How much help for Toileting?: A Little  How much help for putting on and taking off regular upper body clothing?: A Little  How much help for taking care of personal grooming?: A Little  How much help for eating meals?: None  AM-PAC Inpatient Daily Activity Raw Score: 17  AM-PAC Inpatient ADL T-Scale Score : 37.26  ADL Inpatient CMS 0-100% Score: 50.11  ADL Inpatient CMS G-Code Modifier : CK    Based on patient's functional performance as stated below and their level of assistance needed prior to admission, this therapist believes that the patient would benefit from skilled Occupational Therapy following their hospital stay in an effort to increase safety and independence with completion of ADL/IADL tasks for functional independence and quality of life. Diagnosis:   1. SBO (small bowel obstruction) ([address]    2. Incarcerated hernia    3. Postoperative pain      Pertinent Medical History:   Past Medical History:   Diagnosis Date    Acute CVA (cerebrovascular accident) ([address] 10/28/2020    Anxiety     Depression     Headache(784.0)     Hypertension     Narcolepsy     Obesity     PTSD (post-traumatic stress disorder)     Tinnitus     Tobacco abuse     Unspecified sleep apnea       Precautions:  Falls, hx of CVA with R side affected, Laparoscopic repair of incarcerated strangulated umbilical hernia with small bowel resection, partial omentectomy 2021   Pain Scale: Numeric Rate: 3-4/10 pain in abdomin; Nursing notified.     Social history: with alone  Home architecture: single family home, 1 story, 3 steps to enter with rail, tub shower. PLOF: independent with BADL and independent with IADL, pt ambulated with no device   Equipment owned: none   Cognition: A&O x 4; follows 3 step directions. good  Problem solving skills   good  Memory    good  Sequencing   good  Judgement/safety  Communication: intact   Visual perceptual skills: intact     Glasses: no   Edema: no    Sensation: intact   Hand Dominance:  Left     X Right     Left Right Comment   Passive range of motion [facility]     Active range of motion [facility]     Muscle Grade 4/5 4/5    /pinch Strength Intact  Intact       Functional Assessment:   Initial Evaluation Status Date:   1/5/2021 Treatment Status  Date: STGs = LTGs  Time frame: 5 - 14 days   Feeding Independent   Independent    Grooming Supervision   Independent    UB Dressing Minimal Assist   Independent    LB Dressing Moderate Assist   Independent    Bathing Moderate Assist  Independent    Toileting Supervision to stand to use urinal at bedside  Independent    Bed Mobility  Facilitated Supine to sit: Supervision   Scooting:Supervision  Sit to supine: Supervision    Rolling: Supervision  Pt educated in log rolling technique  Supine to sit: Independent   Scooting:Independent  Sit to supine: Independent   Rolling: Independent   Functional Transfers Supervision from EOB to IV. Supervision to and from low commode with verbal cues to use grab bar for safe commode transfer. Transfer training with verbal cues for hand placement throughout session to improve safety. Independent    Functional Mobility Minimal Assist with IV to improve balance to and from bathroom and within the room, verbal cues for sequence and safety.    Independent    Activity Tolerance Fair   Good      Balance:   Sitting balance at EOB to increase dynamic sitting balance and activities tolerance with Supervision     Standing fair with wheeled walker to current deficits:   Functional mobility [x]        ADLs [x]        Strength [x]      Cognition []  Functional transfers  [x]       IADLs [x]        Safety Awareness []      Endurance [x]  Fine Motor Coordination []       Balance [x]       Vision/perception []     Sensation []   Gross Motor Coordination []       ROM []         Delirium []                          Motor Control []    Plan of Care: OT 1-3x/week for 5-7 days PRN   Instruction/training on adapted ADL techniques and AE recommendations to increased functional independence with precautions   Functional transfer/mobility training/DME recommendations for increased independence, safety, and fall prevention    Therapeutic activity to facilitate/challenge dynamic balance, standing tolerance, fine motor dexterity/in-hand manipulation for increased independence with ALD's    Functional Mobility Training   Training on energy conservation techniques/strategies to improve independence/tolerance for self care routine   Positioning to Improve Functional [place], Safety, and Skin Integrity   Patient/family education to increase follow through with safety techniques and functional independence   Therapeutic exercise to improve motor endurance, ROM, and functional strength for ALD's and functional transfers   Cognitive retraining/development of therapeutic activities to improve problem solving, judgement, memory, and attention for increased safety/participation in ADL's/IADL tasks           Rehab Potential: Good for established goals developed with patient and family. Patient / Family Goal: return home      Patient and/or family were instructed on functional diagnosis, prognosis/goals and OT plan of care. Demonstrated fair understanding.     Evaluation time includes thorough review of current medical information, gathering information on past medical history/social history and prior level of function, completion of standardized testing/informal observation of tasks, assessment of data, and development of POC/Goals.     Time In: 2:40 pm    Time Out: 3:10 pm                  Min Units   OT Eval Low 58766 X     OT Eval Medium 06259     OT Eval High 68421     OT Re-Eval [id number]          ADL/Self Care [id number] 8    Therapeutic Activities [id number] 15    Therapeutic Ex 89020     Orthotic Management [id number]     Neuro Re-Ed 88867     Non-Billable Time     TOTAL TIMED TREATMENT [address] OTR/L [id number]

## 2021-01-09 LAB
ANION GAP SERPL CALCULATED.3IONS-SCNC: 11 MMOL/L (ref 7–16)
BASOPHILS ABSOLUTE: 0.07 E9/L (ref 0–0.2)
BASOPHILS RELATIVE PERCENT: 0.6 % (ref 0–2)
BUN BLDV-MCNC: 11 MG/DL (ref 6–20)
CALCIUM SERPL-MCNC: 8.7 MG/DL (ref 8.6–10.2)
CHLORIDE BLD-SCNC: 100 MMOL/L (ref 98–107)
CO2: 24 MMOL/L (ref 22–29)
CREAT SERPL-MCNC: 0.8 MG/DL (ref 0.7–1.2)
EOSINOPHILS ABSOLUTE: 0.15 E9/L (ref 0.05–0.5)
EOSINOPHILS RELATIVE PERCENT: 1.3 % (ref 0–6)
GFR AFRICAN AMERICAN: >60
GFR NON-AFRICAN AMERICAN: >60 ML/MIN/1.73
GLUCOSE BLD-MCNC: 141 MG/DL (ref 74–99)
HCT VFR BLD CALC: 39.6 % (ref 37–54)
HEMOGLOBIN: 12.9 G/DL (ref 12.5–16.5)
IMMATURE GRANULOCYTES #: 0.04 E9/L
IMMATURE GRANULOCYTES %: 0.3 % (ref 0–5)
LYMPHOCYTES ABSOLUTE: 1.97 E9/L (ref 1.5–4)
LYMPHOCYTES RELATIVE PERCENT: 16.7 % (ref 20–42)
MCH RBC QN AUTO: 31.9 PG (ref 26–35)
MCHC RBC AUTO-ENTMCNC: 32.6 % (ref 32–34.5)
MCV RBC AUTO: 98 FL (ref 80–99.9)
METER GLUCOSE: 120 MG/DL (ref 74–99)
METER GLUCOSE: 130 MG/DL (ref 74–99)
METER GLUCOSE: 143 MG/DL (ref 74–99)
METER GLUCOSE: 160 MG/DL (ref 74–99)
MONOCYTES ABSOLUTE: 1.38 E9/L (ref 0.1–0.95)
MONOCYTES RELATIVE PERCENT: 11.7 % (ref 2–12)
NEUTROPHILS ABSOLUTE: 8.16 E9/L (ref 1.8–7.3)
NEUTROPHILS RELATIVE PERCENT: 69.4 % (ref 43–80)
PDW BLD-RTO: 14 FL (ref 11.5–15)
PLATELET # BLD: 133 E9/L (ref 130–450)
PMV BLD AUTO: 10.1 FL (ref 7–12)
POTASSIUM SERPL-SCNC: 3.4 MMOL/L (ref 3.5–5)
RBC # BLD: 4.04 E12/L (ref 3.8–5.8)
SODIUM BLD-SCNC: 135 MMOL/L (ref 132–146)
TROPONIN: <0.01 NG/ML (ref 0–0.03)
WBC # BLD: 11.8 E9/L (ref 4.5–11.5)

## 2021-01-09 PROCEDURE — 80048 BASIC METABOLIC PNL TOTAL CA: CPT

## 2021-01-09 PROCEDURE — 85025 COMPLETE CBC W/AUTO DIFF WBC: CPT

## 2021-01-09 PROCEDURE — 6360000002 HC RX W HCPCS: Performed by: SURGERY

## 2021-01-09 PROCEDURE — 6370000000 HC RX 637 (ALT 250 FOR IP): Performed by: INTERNAL MEDICINE

## 2021-01-09 PROCEDURE — 99024 POSTOP FOLLOW-UP VISIT: CPT | Performed by: SURGERY

## 2021-01-09 PROCEDURE — 2580000003 HC RX 258: Performed by: SURGERY

## 2021-01-09 PROCEDURE — 2700000000 HC OXYGEN THERAPY PER DAY

## 2021-01-09 PROCEDURE — 84484 ASSAY OF TROPONIN QUANT: CPT

## 2021-01-09 PROCEDURE — 6360000002 HC RX W HCPCS: Performed by: INTERNAL MEDICINE

## 2021-01-09 PROCEDURE — 82962 GLUCOSE BLOOD TEST: CPT

## 2021-01-09 PROCEDURE — 6370000000 HC RX 637 (ALT 250 FOR IP): Performed by: SURGERY

## 2021-01-09 PROCEDURE — 94660 CPAP INITIATION&MGMT: CPT

## 2021-01-09 PROCEDURE — 94640 AIRWAY INHALATION TREATMENT: CPT

## 2021-01-09 PROCEDURE — 36415 COLL VENOUS BLD VENIPUNCTURE: CPT

## 2021-01-09 PROCEDURE — 2500000003 HC RX 250 WO HCPCS: Performed by: SURGERY

## 2021-01-09 PROCEDURE — 1200000000 HC SEMI PRIVATE

## 2021-01-09 RX ORDER — POTASSIUM CHLORIDE 20 MEQ/1
20 TABLET, EXTENDED RELEASE ORAL ONCE
Status: COMPLETED | OUTPATIENT
Start: 2021-01-09 | End: 2021-01-09

## 2021-01-09 RX ADMIN — HYDROCHLOROTHIAZIDE 12.5 MG: 12.5 TABLET ORAL at 08:49

## 2021-01-09 RX ADMIN — INSULIN LISPRO 2 UNITS: 100 INJECTION, SOLUTION INTRAVENOUS; SUBCUTANEOUS at 09:02

## 2021-01-09 RX ADMIN — METRONIDAZOLE 500 MG: 500 INJECTION, SOLUTION INTRAVENOUS at 15:06

## 2021-01-09 RX ADMIN — TRAMADOL HYDROCHLORIDE 100 MG: 50 TABLET, FILM COATED ORAL at 02:30

## 2021-01-09 RX ADMIN — TRAMADOL HYDROCHLORIDE 100 MG: 50 TABLET, FILM COATED ORAL at 08:49

## 2021-01-09 RX ADMIN — ASPIRIN 81 MG CHEWABLE TABLET 81 MG: 81 TABLET CHEWABLE at 08:48

## 2021-01-09 RX ADMIN — POTASSIUM CHLORIDE 20 MEQ: 1500 TABLET, EXTENDED RELEASE ORAL at 15:07

## 2021-01-09 RX ADMIN — POTASSIUM CHLORIDE AND SODIUM CHLORIDE: 900; 150 INJECTION, SOLUTION INTRAVENOUS at 11:23

## 2021-01-09 RX ADMIN — LOSARTAN POTASSIUM 25 MG: 50 TABLET, FILM COATED ORAL at 08:49

## 2021-01-09 RX ADMIN — IPRATROPIUM BROMIDE AND ALBUTEROL SULFATE 1 AMPULE: .5; 3 SOLUTION RESPIRATORY (INHALATION) at 06:10

## 2021-01-09 RX ADMIN — ATORVASTATIN CALCIUM 80 MG: 40 TABLET, FILM COATED ORAL at 21:03

## 2021-01-09 RX ADMIN — IPRATROPIUM BROMIDE AND ALBUTEROL SULFATE 1 AMPULE: .5; 3 SOLUTION RESPIRATORY (INHALATION) at 09:47

## 2021-01-09 RX ADMIN — METRONIDAZOLE 500 MG: 500 INJECTION, SOLUTION INTRAVENOUS at 05:38

## 2021-01-09 RX ADMIN — CLOPIDOGREL BISULFATE 75 MG: 75 TABLET ORAL at 08:48

## 2021-01-09 RX ADMIN — POTASSIUM CHLORIDE AND SODIUM CHLORIDE: 900; 150 INJECTION, SOLUTION INTRAVENOUS at 05:38

## 2021-01-09 RX ADMIN — ENOXAPARIN SODIUM 40 MG: 40 INJECTION SUBCUTANEOUS at 08:48

## 2021-01-09 RX ADMIN — WATER 2 G: 1 INJECTION INTRAMUSCULAR; INTRAVENOUS; SUBCUTANEOUS at 04:12

## 2021-01-09 RX ADMIN — IPRATROPIUM BROMIDE AND ALBUTEROL SULFATE 1 AMPULE: .5; 3 SOLUTION RESPIRATORY (INHALATION) at 18:52

## 2021-01-09 RX ADMIN — TRAMADOL HYDROCHLORIDE 100 MG: 50 TABLET, FILM COATED ORAL at 19:51

## 2021-01-09 RX ADMIN — METRONIDAZOLE 500 MG: 500 INJECTION, SOLUTION INTRAVENOUS at 21:04

## 2021-01-09 RX ADMIN — IPRATROPIUM BROMIDE AND ALBUTEROL SULFATE 1 AMPULE: .5; 3 SOLUTION RESPIRATORY (INHALATION) at 13:49

## 2021-01-09 RX ADMIN — INSULIN LISPRO 1 UNITS: 100 INJECTION, SOLUTION INTRAVENOUS; SUBCUTANEOUS at 21:04

## 2021-01-09 ASSESSMENT — PAIN DESCRIPTION - PAIN TYPE: TYPE: ACUTE PAIN;SURGICAL PAIN

## 2021-01-09 ASSESSMENT — PAIN DESCRIPTION - PROGRESSION: CLINICAL_PROGRESSION: NOT CHANGED

## 2021-01-09 ASSESSMENT — PAIN DESCRIPTION - FREQUENCY: FREQUENCY: CONTINUOUS

## 2021-01-09 ASSESSMENT — PAIN SCALES - GENERAL: PAINLEVEL_OUTOF10: 7

## 2021-01-09 ASSESSMENT — PAIN DESCRIPTION - ONSET: ONSET: ON-GOING

## 2021-01-09 ASSESSMENT — PAIN - FUNCTIONAL ASSESSMENT: PAIN_FUNCTIONAL_ASSESSMENT: PREVENTS OR INTERFERES SOME ACTIVE ACTIVITIES AND ADLS

## 2021-01-09 ASSESSMENT — PAIN DESCRIPTION - DESCRIPTORS: DESCRIPTORS: ACHING;DISCOMFORT;SORE;TENDER

## 2021-01-09 NOTE — PROGRESS NOTES
Department of Internal Medicine        CHIEF COMPLAINT: Periumbilical/abdominal pain    Reason for Admission: Incarcerated umbilical hernia with small bowel obstruction    HISTORY OF PRESENT ILLNESS:      The patient is a 45 y.o. male who presents with mid abdominal pain. Pain started since 6 PM yesterday. Patient has a history of umbilical hernia for the past 14 years but is always able to reduce the hernia. Patient was unable to reduce it started last night and his discomfort became much worse. Patient went to the ED with this CAT scan showing periumbilical ventral hernia with herniation of small bowel. Patient developed secondary proximal small bowel obstruction because of the hernia. Patient also has a large hiatal hernia. Patient has a small infrarenal abdominal aortic aneurysm with maximum diameter of 3 cm. Patient had a lactic acid 3.1 in the ED. WBC was 14.2 this morning and patient was taken to surgery early this morning. Patient is seen postop. Patient denies any problem with chest pain, unusual shortness of breath, palpitations or dizziness. Patient has expected postop discomfort. 1/8/2021  Patient seen examined on general medical floor. Patient has postop discomfort. Patient also complains of some lower chest burning/pressure since surgery. Patient thinks he occasionally has some of that same discomfort at home off and on. Patient denies any known history of coronary artery disease or CHF nor has he ever had a stress test.  Patient also gets somewhat short of breath with any activity. BUN/creatinine was 16/0.9. Fasting blood sugar 136 today. WBC 12.7 with hemoglobin 13.6. Temperature is 98.3 with a heart rate of 81. Blood pressure 131/72. O2 sat currently is 91% on room air. 1/9/2021  Patient seen examined on general medical floor. Patient has expected postop discomfort. Patient denies fever, chest pain, dizziness or unusual shortness of breath.   Patient denies any more of the chest heaviness since yesterday. BUN/creatinine 11/0.8 with potassium 3.4 today. Blood sugars ranging 122-144. Troponin was less than 0.01 last night with WBC 11.8 hemoglobin 12.9. Temperature 98.7 with heart rate 81. Blood pressure currently 157/97 with O2 sat 93% on 2 L nasal cannula. Nursing stated they noticed that his O2 saturation has dropped down to 88% on room air earlier today. Patient denies unusual shortness of breath when told about his low O2 saturation. Urinary output is good. Past Medical History:    Past Medical History:   Diagnosis Date    Acute CVA (cerebrovascular accident) ([facility] [address] 10/28/2020    Anxiety     Depression     Headache(784.0)     Hypertension     Narcolepsy     Obesity     PTSD (post-traumatic stress disorder)     Tinnitus     Tobacco abuse     Unspecified sleep apnea      Past Surgical History:    Past Surgical History:   Procedure Laterality Date    HAND SURGERY      SMALL INTESTINE SURGERY N/A 1/7/2021    LAPAROSCOPIC POSS OPEN REPAIR INCARERATED INCISIONAL HERNIAsmall bowel resection performed by Jose Hamilton MD at [id number]       Medications Prior to Admission:    @  Prior to Admission medications    Medication Sig Start Date End Date Taking? Authorizing Provider   docusate sodium (COLACE) 100 MG capsule Take 1 capsule by mouth 2 times daily for 15 days 1/8/21 1/23/21 Yes Jose Hamilton MD   oxyCODONE-acetaminophen (PERCOCET) 5-325 MG per tablet Take 1 tablet by mouth every 6 hours as needed for Pain for up to 5 days.  1/8/21 1/13/21 Yes Jose Hamilton MD   ondansetron (ZOFRAN-ODT) 4 MG disintegrating tablet Take 1 tablet by mouth every 8 hours as needed for Nausea or Vomiting 1/8/21  Yes Jose Hamilton MD   metFORMIN (GLUCOPHAGE) 500 MG tablet Take 500 mg by mouth 2 times daily (with meals)   Yes Historical Provider, MD   aspirin 81 MG chewable tablet Take 1 tablet by mouth daily 10/30/20   Gregorio Palmer DO   enoxaparin (LOVENOX) 40 MG/0.4ML injection Inject 0.4 mLs into the skin daily 10/30/20   Buelah Loge, DO   atorvastatin (LIPITOR) 80 MG tablet Take 1 tablet by mouth nightly 10/31/20   Cassandra , DO   clopidogrel (PLAVIX) 75 MG tablet Take 1 tablet by mouth daily 10/30/20   Cassandra Log, DO   hydrochlorothiazide (HYDRODIURIL) 25 MG tablet Take 12.5 mg by mouth daily. Every other day    Historical Provider, MD   losartan (COZAAR) 25 MG tablet Take 25 mg by mouth daily. Takes every other day    Historical Provider, MD       Allergies:  Patient has no known allergies.     Social History:   Social History     Socioeconomic History    Marital status: Single     Spouse name: Not on file    Number of children: Not on file    Years of education: Not on file    Highest education level: Not on file   Occupational History    Not on file   Social Needs    Financial resource strain: Not on file    Food insecurity     Worry: Not on file     Inability: Not on file    Transportation needs     Medical: Not on file     Non-medical: Not on file   Tobacco Use    Smoking status: Former Smoker     Packs/day: 1.50     Years: 20.00     Pack years: 30.00     Types: Cigarettes     Quit date: 2019     Years since quittin.1    Smokeless tobacco: Never Used   Substance and Sexual Activity    Alcohol use: No    Drug use: No    Sexual activity: Not on file   Lifestyle    Physical activity     Days per week: Not on file     Minutes per session: Not on file    Stress: Not on file   Relationships    Social connections     Talks on phone: Not on file     Gets together: Not on file     Attends Faith service: Not on file     Active member of club or organization: Not on file     Attends meetings of clubs or organizations: Not on file     Relationship status: Not on file    Intimate partner violence     Fear of current or ex partner: Not on file     Emotionally abused: Not on file     Physically abused: Not on file     Forced sexual activity: Not on file   Other Topics Concern    Not on file   Social History Narrative    Not on file       Family History:   History reviewed. No pertinent family history. REVIEW OF SYSTEMS:    Gen: Patient denies any lightheadedness or dizziness. No LOC or syncope. No fevers or chills. HEENT: No earache, sore throat or nasal congestion. Resp: Denies cough, hemoptysis or sputum production. Cardiac: Denies chest pain, SOB, diaphoresis or palpitations. GI: + Mid abdominal pain.  + nausea, vomiting, diarrhea or constipation. No melena or hematochezia. : No urinary complaints, dysuria, hematuria or frequency. MSK: No extremity weakness, paralysis or paresthesias. PHYSICAL EXAM:    Vitals:  BP (!) 141/87   Pulse 85   Temp 98.6 °F (37 °C) (Oral)   Resp 18   Ht 6' 4\" (1.93 m)   Wt (!) 306 lb (138.8 kg)   SpO2 92%   BMI 37.25 kg/m²     General:  This is a 45 y.o. yo male who is alert and oriented in NAD  HEENT:  Head is normocephalic and atraumatic, PERRLA, EOMI, mucus membranes moist with no pharyngeal erythema or exudate. Neck:  Supple with no carotid bruits, JVD or thyromegaly.   No cervical adenopathy  CV:  Regular rate and rhythm, no murmurs  Lungs: Coarse breath sounds to auscultation bilaterally with no wheezes, rales or rhonchi  Abdomen:  +distended, postop abdomen, bowel sounds hypoactive  Extremities:  No edema, peripheral pulses intact bilaterally  Neuro:  Cranial nerves II-XII grossly intact; motor and sensory function intact with no focal deficits  Skin:  No rashes, lesions or wounds    DATA:  CBC with Differential:    Lab Results   Component Value Date    WBC 11.8 01/09/2021    RBC 4.04 01/09/2021    HGB 12.9 01/09/2021    HCT 39.6 01/09/2021     01/09/2021    MCV 98.0 01/09/2021    MCH 31.9 01/09/2021    MCHC 32.6 01/09/2021    RDW 14.0 01/09/2021    SEGSPCT 47 01/17/2012    SEGSPCT 64 10/20/2010    LYMPHOPCT 16.7 01/09/2021    MONOPCT 11.7 01/09/2021    EOSPCT 4 10/20/2010    BASOPCT 0.6 01/09/2021    MONOSABS 1.38 01/09/2021    LYMPHSABS 1.97 01/09/2021    EOSABS 0.15 01/09/2021    BASOSABS 0.07 01/09/2021     CMP:    Lab Results   Component Value Date     01/09/2021    K 3.4 01/09/2021    K 4.2 10/28/2020     01/09/2021    CO2 24 01/09/2021    BUN 11 01/09/2021    CREATININE 0.8 01/09/2021    GFRAA >60 01/09/2021    LABGLOM >60 01/09/2021    GLUCOSE 141 01/09/2021    GLUCOSE 98 01/17/2012    PROT 7.2 01/07/2021    LABALBU 4.3 01/07/2021    LABALBU 4.6 01/17/2012    CALCIUM 8.7 01/09/2021    BILITOT 0.7 01/07/2021    ALKPHOS 65 01/07/2021    AST 21 01/07/2021    ALT 22 01/07/2021     Magnesium:    Lab Results   Component Value Date    MG 1.9 01/07/2021     Phosphorus:    Lab Results   Component Value Date    PHOS 3.5 10/29/2020     PT/INR:    Lab Results   Component Value Date    PROTIME 11.5 10/28/2020    INR 1.0 10/28/2020     Troponin:    Lab Results   Component Value Date    TROPONINI <0.01 01/09/2021     U/A:    Lab Results   Component Value Date    COLORU Yellow 10/28/2020    PROTEINU Negative 10/28/2020    PHUR 5.0 10/28/2020    CLARITYU Clear 10/28/2020    SPECGRAV <=1.005 10/28/2020    LEUKOCYTESUR Negative 10/28/2020    UROBILINOGEN 0.2 10/28/2020    BILIRUBINUR Negative 10/28/2020    BLOODU Negative 10/28/2020    GLUCOSEU Negative 10/28/2020     ABG:  No results found for: PH, PCO2, PO2, HCO3, BE, THGB, TCO2, O2SAT  HgBA1c:    Lab Results   Component Value Date    LABA1C 7.1 01/07/2021     FLP:    Lab Results   Component Value Date    TRIG 141 01/07/2021    HDL 33 01/07/2021    LDLCALC 34 01/07/2021    LABVLDL 28 01/07/2021     TSH:    Lab Results   Component Value Date    TSH 1.320 01/07/2021     IRON:  No results found for: IRON  LIPASE:    Lab Results   Component Value Date    LIPASE 64 01/06/2021       ASSESSMENT AND PLAN:      Patient Active Problem List    Diagnosis Date Noted    SBO (small bowel obstruction) ([facility] [address] 01/07/2021    Incarcerated umbilical hernia     Meckels diverticulum     Strangulated umbilical hernia     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 Incarcerated strangulated umbilical hernia with small bowel obstruction  2. Hypertension  3. History of prior tobacco use  4. Obesity  5. Noninsulin-dependent diabetes mellitus type 2 with hemoglobin A1c 7.1  6. History of sleep apnea-patient compliant with BiPAP at home  7.  Acute hypoxic respiratory failure postop    Plan:  Home medications reviewed  Admit to general medical floor  Monitor heart rate, blood pressure, O2 saturation  Consult general surgery  Glucoscans 4 times daily with sliding scale insulin  BiPAP 10/4 per patient request  Increase activity    DuoNeb aerosols changed to 4 times daily and every 4 hours as needed   ECG -discontinued with patient not have any more chest pain  Troponin-normal    Incentive spirometry  O2 nasal cannula  KCl 20 mEq p.o. now x1    Lady Agustin D.O.  1/9/2021  10:22 AM

## 2021-01-09 NOTE — PROGRESS NOTES
Surgery Progress Note            Chief complaint:   Patient Active Problem List   Diagnosis    Depression    HTN (hypertension)    Obesity    Tobacco abuse    Hyperlipidemia    PTSD (post-traumatic stress disorder)    [facility]cerebro[facility] accident) ([facility] [address]    Basilar artery thrombosis    SBO (small bowel obstruction) ([facility] [address]    Incarcerated umbilical hernia    Meckels diverticulum    Strangulated umbilical hernia       S: doing well, no bm or flatus yet    O:   Vitals:    01/09/21 1000   BP: (!) 157/97   Pulse: 81   Resp: 18   Temp: 98.7 °F (37.1 °C)   SpO2: 93%       Intake/Output Summary (Last 24 hours) at 1/9/2021 1044  Last data filed at 1/9/2021 0800  Gross per 24 hour   Intake 1850 ml   Output 1400 ml   Net 450 ml           Labs:  Recent Labs     01/07/21  0555 01/08/21  0445 01/09/21  0423   WBC 14.2* 12.7* 11.8*   HGB 14.7 13.6 12.9   HCT 43.3 41.8 39.6     Lab Results   Component Value Date    CREATININE 0.8 01/09/2021    BUN 11 01/09/2021     01/09/2021    K 3.4 (L) 01/09/2021     01/09/2021    CO2 24 01/09/2021     Recent Labs     01/06/21  2218   LIPASE 64*       Physical exam:   BP (!) 157/97   Pulse 81   Temp 98.7 °F (37.1 °C) (Oral)   Resp 18   Ht 6' 4\" (1.93 m)   Wt (!) 306 lb (138.8 kg)   SpO2 93%   BMI 37.25 kg/m²   General appearance: NAD  Head: NCAT  Neck: supple, no masses  Lungs: equal chest rise bilateral  Heart: S1S2 present  Abdomen: soft, minimally tender, nondistended,  Incisions clean and intact  Skin; no lesions  Gu: no cva tenderness  Extremities: extremities normal, atraumatic, no cyanosis or edema    A:  POD # 2 Laparoscopic repair of incarcerated strangulated umbilical hernia with small bowel resection, partial omentectomy    P: increase diet slowly, slow ivf, continue walking halls, await return of bowel function    Miquel Erazo MD  1/9/2021

## 2021-01-10 LAB
ANION GAP SERPL CALCULATED.3IONS-SCNC: 12 MMOL/L (ref 7–16)
BASOPHILS ABSOLUTE: 0.07 E9/L (ref 0–0.2)
BASOPHILS RELATIVE PERCENT: 0.6 % (ref 0–2)
BUN BLDV-MCNC: 9 MG/DL (ref 6–20)
CALCIUM SERPL-MCNC: 9.1 MG/DL (ref 8.6–10.2)
CHLORIDE BLD-SCNC: 98 MMOL/L (ref 98–107)
CO2: 24 MMOL/L (ref 22–29)
CREAT SERPL-MCNC: 0.8 MG/DL (ref 0.7–1.2)
EOSINOPHILS ABSOLUTE: 0.37 E9/L (ref 0.05–0.5)
EOSINOPHILS RELATIVE PERCENT: 3.1 % (ref 0–6)
GFR AFRICAN AMERICAN: >60
GFR NON-AFRICAN AMERICAN: >60 ML/MIN/1.73
GLUCOSE BLD-MCNC: 147 MG/DL (ref 74–99)
HCT VFR BLD CALC: 39.3 % (ref 37–54)
HEMOGLOBIN: 13.2 G/DL (ref 12.5–16.5)
IMMATURE GRANULOCYTES #: 0.03 E9/L
IMMATURE GRANULOCYTES %: 0.3 % (ref 0–5)
LYMPHOCYTES ABSOLUTE: 2.53 E9/L (ref 1.5–4)
LYMPHOCYTES RELATIVE PERCENT: 21.3 % (ref 20–42)
MCH RBC QN AUTO: 32 PG (ref 26–35)
MCHC RBC AUTO-ENTMCNC: 33.6 % (ref 32–34.5)
MCV RBC AUTO: 95.2 FL (ref 80–99.9)
METER GLUCOSE: 125 MG/DL (ref 74–99)
METER GLUCOSE: 128 MG/DL (ref 74–99)
METER GLUCOSE: 141 MG/DL (ref 74–99)
METER GLUCOSE: 167 MG/DL (ref 74–99)
MONOCYTES ABSOLUTE: 1.29 E9/L (ref 0.1–0.95)
MONOCYTES RELATIVE PERCENT: 10.9 % (ref 2–12)
NEUTROPHILS ABSOLUTE: 7.59 E9/L (ref 1.8–7.3)
NEUTROPHILS RELATIVE PERCENT: 63.8 % (ref 43–80)
PDW BLD-RTO: 13.7 FL (ref 11.5–15)
PLATELET # BLD: 144 E9/L (ref 130–450)
PMV BLD AUTO: 10 FL (ref 7–12)
POTASSIUM SERPL-SCNC: 3.3 MMOL/L (ref 3.5–5)
RBC # BLD: 4.13 E12/L (ref 3.8–5.8)
SODIUM BLD-SCNC: 134 MMOL/L (ref 132–146)
WBC # BLD: 11.9 E9/L (ref 4.5–11.5)

## 2021-01-10 PROCEDURE — 94660 CPAP INITIATION&MGMT: CPT

## 2021-01-10 PROCEDURE — 2500000003 HC RX 250 WO HCPCS: Performed by: SURGERY

## 2021-01-10 PROCEDURE — 6360000002 HC RX W HCPCS: Performed by: SURGERY

## 2021-01-10 PROCEDURE — 99024 POSTOP FOLLOW-UP VISIT: CPT | Performed by: SURGERY

## 2021-01-10 PROCEDURE — 1200000000 HC SEMI PRIVATE

## 2021-01-10 PROCEDURE — 6370000000 HC RX 637 (ALT 250 FOR IP): Performed by: SURGERY

## 2021-01-10 PROCEDURE — 94640 AIRWAY INHALATION TREATMENT: CPT

## 2021-01-10 PROCEDURE — 2580000003 HC RX 258: Performed by: SURGERY

## 2021-01-10 PROCEDURE — 6370000000 HC RX 637 (ALT 250 FOR IP): Performed by: INTERNAL MEDICINE

## 2021-01-10 PROCEDURE — 85025 COMPLETE CBC W/AUTO DIFF WBC: CPT

## 2021-01-10 PROCEDURE — 82962 GLUCOSE BLOOD TEST: CPT

## 2021-01-10 PROCEDURE — 36415 COLL VENOUS BLD VENIPUNCTURE: CPT

## 2021-01-10 PROCEDURE — 80048 BASIC METABOLIC PNL TOTAL CA: CPT

## 2021-01-10 RX ORDER — POTASSIUM CHLORIDE 20 MEQ/1
40 TABLET, EXTENDED RELEASE ORAL ONCE
Status: COMPLETED | OUTPATIENT
Start: 2021-01-10 | End: 2021-01-10

## 2021-01-10 RX ADMIN — METFORMIN HYDROCHLORIDE 500 MG: 500 TABLET ORAL at 17:28

## 2021-01-10 RX ADMIN — TRAMADOL HYDROCHLORIDE 100 MG: 50 TABLET, FILM COATED ORAL at 13:35

## 2021-01-10 RX ADMIN — POTASSIUM CHLORIDE 40 MEQ: 1500 TABLET, EXTENDED RELEASE ORAL at 13:36

## 2021-01-10 RX ADMIN — METRONIDAZOLE 500 MG: 500 INJECTION, SOLUTION INTRAVENOUS at 13:39

## 2021-01-10 RX ADMIN — IPRATROPIUM BROMIDE AND ALBUTEROL SULFATE 1 AMPULE: .5; 3 SOLUTION RESPIRATORY (INHALATION) at 18:37

## 2021-01-10 RX ADMIN — CLOPIDOGREL BISULFATE 75 MG: 75 TABLET ORAL at 09:14

## 2021-01-10 RX ADMIN — HYDROCHLOROTHIAZIDE 12.5 MG: 12.5 TABLET ORAL at 09:14

## 2021-01-10 RX ADMIN — TRAMADOL HYDROCHLORIDE 100 MG: 50 TABLET, FILM COATED ORAL at 21:27

## 2021-01-10 RX ADMIN — ATORVASTATIN CALCIUM 80 MG: 40 TABLET, FILM COATED ORAL at 21:27

## 2021-01-10 RX ADMIN — METRONIDAZOLE 500 MG: 500 INJECTION, SOLUTION INTRAVENOUS at 21:27

## 2021-01-10 RX ADMIN — METFORMIN HYDROCHLORIDE 500 MG: 500 TABLET ORAL at 09:15

## 2021-01-10 RX ADMIN — WATER 2 G: 1 INJECTION INTRAMUSCULAR; INTRAVENOUS; SUBCUTANEOUS at 04:21

## 2021-01-10 RX ADMIN — IPRATROPIUM BROMIDE AND ALBUTEROL SULFATE 1 AMPULE: .5; 3 SOLUTION RESPIRATORY (INHALATION) at 09:16

## 2021-01-10 RX ADMIN — IPRATROPIUM BROMIDE AND ALBUTEROL SULFATE 1 AMPULE: .5; 3 SOLUTION RESPIRATORY (INHALATION) at 06:25

## 2021-01-10 RX ADMIN — IPRATROPIUM BROMIDE AND ALBUTEROL SULFATE 1 AMPULE: .5; 3 SOLUTION RESPIRATORY (INHALATION) at 13:37

## 2021-01-10 RX ADMIN — METRONIDAZOLE 500 MG: 500 INJECTION, SOLUTION INTRAVENOUS at 05:55

## 2021-01-10 RX ADMIN — INSULIN LISPRO 2 UNITS: 100 INJECTION, SOLUTION INTRAVENOUS; SUBCUTANEOUS at 17:31

## 2021-01-10 RX ADMIN — ENOXAPARIN SODIUM 40 MG: 40 INJECTION SUBCUTANEOUS at 09:12

## 2021-01-10 RX ADMIN — LOSARTAN POTASSIUM 25 MG: 50 TABLET, FILM COATED ORAL at 09:15

## 2021-01-10 RX ADMIN — ASPIRIN 81 MG CHEWABLE TABLET 81 MG: 81 TABLET CHEWABLE at 09:14

## 2021-01-10 RX ADMIN — INSULIN LISPRO 2 UNITS: 100 INJECTION, SOLUTION INTRAVENOUS; SUBCUTANEOUS at 13:43

## 2021-01-10 RX ADMIN — TRAMADOL HYDROCHLORIDE 100 MG: 50 TABLET, FILM COATED ORAL at 05:54

## 2021-01-10 ASSESSMENT — PAIN SCALES - GENERAL
PAINLEVEL_OUTOF10: 7
PAINLEVEL_OUTOF10: 5
PAINLEVEL_OUTOF10: 4

## 2021-01-10 NOTE — PROGRESS NOTES
chest heaviness since yesterday. BUN/creatinine 11/0.8 with potassium 3.4 today. Blood sugars ranging 122-144. Troponin was less than 0.01 last night with WBC 11.8 hemoglobin 12.9. Temperature 98.7 with heart rate 81. Blood pressure currently 157/97 with O2 sat 93% on 2 L nasal cannula. Nursing stated they noticed that his O2 saturation has dropped down to 88% on room air earlier today. Patient denies unusual shortness of breath when told about his low O2 saturation. Urinary output is good. 1/10/2021  Patient seen examined on general medical floor. Patient denies any chest pain. Patient had good bowel movement and has less pressure sensation in his abdomen. Patient's breathing is about the same. Patient is compliant with incentive spirometer. O2 saturation 87% on room air this morning at rest.  BUN/creatinine is 9/0.8 with potassium of 3.3. Blood sugars ranging 128-160. WBC 11.9 with hemoglobin 13.2. Temperature 98.4 with heart rate 85. Blood pressure currently 130/83. Urine output is adequate. We will give her milliequivalents KCl x1 now. Get a chest x-ray PA and lateral.  Discussed with patient about possibly home O2 which he does not want to do at this time. Past Medical History:    Past Medical History:   Diagnosis Date    Acute CVA (cerebrovascular accident) ([facility] [address] 10/28/2020    Anxiety     Depression     Headache(784.0)     Hypertension     Narcolepsy     Obesity     PTSD (post-traumatic stress disorder)     Tinnitus     Tobacco abuse     Unspecified sleep apnea      Past Surgical History:    Past Surgical History:   Procedure Laterality Date    HAND SURGERY      SMALL INTESTINE SURGERY N/A 1/7/2021    LAPAROSCOPIC POSS OPEN REPAIR INCARERATED INCISIONAL HERNIAsmall bowel resection performed by Luisa Regan MD at [id number]       Medications Prior to Admission:    @  Prior to Admission medications    Medication Sig Start Date End Date Taking?  Authorizing Provider docusate sodium (COLACE) 100 MG capsule Take 1 capsule by mouth 2 times daily for 15 days 21 Yes Peter Guzman MD   oxyCODONE-acetaminophen (PERCOCET) 5-325 MG per tablet Take 1 tablet by mouth every 6 hours as needed for Pain for up to 5 days. 21 Yes Peter Guzman MD   ondansetron (ZOFRAN-ODT) 4 MG disintegrating tablet Take 1 tablet by mouth every 8 hours as needed for Nausea or Vomiting 21  Yes Peter Guzman MD   metFORMIN (GLUCOPHAGE) 500 MG tablet Take 500 mg by mouth 2 times daily (with meals)   Yes Historical Provider, MD   aspirin 81 MG chewable tablet Take 1 tablet by mouth daily 10/30/20   Roselind Simmering, DO   enoxaparin (LOVENOX) 40 MG/0.4ML injection Inject 0.4 mLs into the skin daily 10/30/20   Roselind Simmering, DO   atorvastatin (LIPITOR) 80 MG tablet Take 1 tablet by mouth nightly 10/31/20   Roselind Simmering, DO   clopidogrel (PLAVIX) 75 MG tablet Take 1 tablet by mouth daily 10/30/20   Roselind Simmering, DO   hydrochlorothiazide (HYDRODIURIL) 25 MG tablet Take 12.5 mg by mouth daily. Every other day    Historical Provider, MD   losartan (COZAAR) 25 MG tablet Take 25 mg by mouth daily. Takes every other day    Historical Provider, MD       Allergies:  Patient has no known allergies.     Social History:   Social History     Socioeconomic History    Marital status: Single     Spouse name: Not on file    Number of children: Not on file    Years of education: Not on file    Highest education level: Not on file   Occupational History    Not on file   Social Needs    Financial resource strain: Not on file    Food insecurity     Worry: Not on file     Inability: Not on file    Transportation needs     Medical: Not on file     Non-medical: Not on file   Tobacco Use    Smoking status: Former Smoker     Packs/day: 1.50     Years: 20.00     Pack years: 30.00     Types: Cigarettes     Quit date: 2019     Years since quittin.1    Smokeless tobacco: Never Used   Substance and Sexual Activity    Alcohol use: No    Drug use: No    Sexual activity: Not on file   Lifestyle    Physical activity     Days per week: Not on file     Minutes per session: Not on file    Stress: Not on file   Relationships    Social connections     Talks on phone: Not on file     Gets together: Not on file     Attends Sabianism service: Not on file     Active member of club or organization: Not on file     Attends meetings of clubs or organizations: Not on file     Relationship status: Not on file    Intimate partner violence     Fear of current or ex partner: Not on file     Emotionally abused: Not on file     Physically abused: Not on file     Forced sexual activity: Not on file   Other Topics Concern    Not on file   Social History Narrative    Not on file       Family History:   History reviewed. No pertinent family history. REVIEW OF SYSTEMS:    Gen: Patient denies any lightheadedness or dizziness. No LOC or syncope. No fevers or chills. HEENT: No earache, sore throat or nasal congestion. Resp: Denies cough, hemoptysis or sputum production. Cardiac: Denies chest pain, SOB, diaphoresis or palpitations. GI: + Mid abdominal pain.  + nausea, vomiting, diarrhea or constipation. No melena or hematochezia. : No urinary complaints, dysuria, hematuria or frequency. MSK: No extremity weakness, paralysis or paresthesias. PHYSICAL EXAM:    Vitals:  /83   Pulse 85   Temp 98.4 °F (36.9 °C) (Oral)   Resp 18   Ht 6' 4\" (1.93 m)   Wt (!) 306 lb (138.8 kg)   SpO2 90%   BMI 37.25 kg/m²     General:  This is a 45 y.o. yo male who is alert and oriented in NAD  HEENT:  Head is normocephalic and atraumatic, PERRLA, EOMI, mucus membranes moist with no pharyngeal erythema or exudate. Neck:  Supple with no carotid bruits, JVD or thyromegaly.   No cervical adenopathy  CV:  Regular rate and rhythm, no murmurs  Lungs: Coarse breath sounds to auscultation bilaterally with no wheezes, rales or rhonchi  Abdomen:  +distended, postop abdomen, bowel sounds hypoactive  Extremities:  No edema, peripheral pulses intact bilaterally  Neuro:  Cranial nerves II-XII grossly intact; motor and sensory function intact with no focal deficits  Skin:  No rashes, lesions or wounds    DATA:  CBC with Differential:    Lab Results   Component Value Date    WBC 11.9 01/10/2021    RBC 4.13 01/10/2021    HGB 13.2 01/10/2021    HCT 39.3 01/10/2021     01/10/2021    MCV 95.2 01/10/2021    MCH 32.0 01/10/2021    MCHC 33.6 01/10/2021    RDW 13.7 01/10/2021    SEGSPCT 47 01/17/2012    SEGSPCT 64 10/20/2010    LYMPHOPCT 21.3 01/10/2021    MONOPCT 10.9 01/10/2021    EOSPCT 4 10/20/2010    BASOPCT 0.6 01/10/2021    MONOSABS 1.29 01/10/2021    LYMPHSABS 2.53 01/10/2021    EOSABS 0.37 01/10/2021    BASOSABS 0.07 01/10/2021     CMP:    Lab Results   Component Value Date     01/10/2021    K 3.3 01/10/2021    K 4.2 10/28/2020    CL 98 01/10/2021    CO2 24 01/10/2021    BUN 9 01/10/2021    CREATININE 0.8 01/10/2021    GFRAA >60 01/10/2021    LABGLOM >60 01/10/2021    GLUCOSE 147 01/10/2021    GLUCOSE 98 01/17/2012    PROT 7.2 01/07/2021    LABALBU 4.3 01/07/2021    LABALBU 4.6 01/17/2012    CALCIUM 9.1 01/10/2021    BILITOT 0.7 01/07/2021    ALKPHOS 65 01/07/2021    AST 21 01/07/2021    ALT 22 01/07/2021     Magnesium:    Lab Results   Component Value Date    MG 1.9 01/07/2021     Phosphorus:    Lab Results   Component Value Date    PHOS 3.5 10/29/2020     PT/INR:    Lab Results   Component Value Date    PROTIME 11.5 10/28/2020    INR 1.0 10/28/2020     Troponin:    Lab Results   Component Value Date    TROPONINI <0.01 01/09/2021     U/A:    Lab Results   Component Value Date    COLORU Yellow 10/28/2020    PROTEINU Negative 10/28/2020    PHUR 5.0 10/28/2020    CLARITYU Clear 10/28/2020    SPECGRAV <=1.005 10/28/2020    LEUKOCYTESUR Negative 10/28/2020    UROBILINOGEN 0.2 10/28/2020 BILIRUBINUR Negative 10/28/2020    BLOODU Negative 10/28/2020    GLUCOSEU Negative 10/28/2020     ABG:  No results found for: PH, PCO2, PO2, HCO3, BE, THGB, TCO2, O2SAT  HgBA1c:    Lab Results   Component Value Date    LABA1C 7.1 01/07/2021     FLP:    Lab Results   Component Value Date    TRIG 141 01/07/2021    HDL 33 01/07/2021    LDLCALC 34 01/07/2021    LABVLDL 28 01/07/2021     TSH:    Lab Results   Component Value Date    TSH 1.320 01/07/2021     IRON:  No results found for: IRON  LIPASE:    Lab Results   Component Value Date    LIPASE 64 01/06/2021       ASSESSMENT AND PLAN:      Patient Active Problem List    Diagnosis Date Noted    SBO (small bowel obstruction) ([facility] [address] 01/07/2021    Incarcerated umbilical hernia     Meckels diverticulum     Strangulated umbilical hernia     Basilar artery thrombosis 10/29/2020    Acute CVA (cerebrovascular accident) ([facility] [address] 10/28/2020    Tobacco abuse 06/25/2012    Hyperlipidemia 06/25/2012    PTSD (post-traumatic stress disorder) 06/25/2012    Depression 12/06/2010    HTN (hypertension) 12/06/2010    Obesity 12/06/2010     Impression:  1. Incarcerated strangulated umbilical hernia with small bowel obstruction  2. Hypertension  3. History of prior tobacco use  4. Obesity  5. Noninsulin-dependent diabetes mellitus type 2 with hemoglobin A1c 7.1  6. History of sleep apnea-patient compliant with BiPAP at home  7.  Acute hypoxic respiratory failure postop    Plan:  Home medications reviewed  Admit to general medical floor  Monitor heart rate, blood pressure, O2 saturation  Consult general surgery  Glucoscans 4 times daily with sliding scale insulin  BiPAP 10/4 per patient request  Increase activity  DuoNeb aerosols changed to 4 times daily and every 4 hours as needed   Troponin-normal    Incentive spirometry  O2 nasal cannula  Check O2 saturation on room air at rest and with activity daily  KCl 40 mEq p.o. now x1  Chest x-ray PA and lateral    Jordy Soda,

## 2021-01-10 NOTE — PROGRESS NOTES
Surgery Progress Note            Chief complaint:   Patient Active Problem List   Diagnosis    Depression    HTN (hypertension)    Obesity    Tobacco abuse    Hyperlipidemia    PTSD (post-traumatic stress disorder)    [facility]    Basilar artery thrombosis    SBO [facility]small bowel obstruction) [facility]    Incarcerated umbilical hernia    Meckels diverticulum    Strangulated umbilical hernia       S: doing well, moved bowel and tolerated general diet. O:   Vitals:    01/10/21 0554   BP:    Pulse:    Resp:    Temp:    SpO2: 92%       Intake/Output Summary (Last 24 hours) at 1/10/2021 0857  Last data filed at 1/10/2021 0453  Gross per 24 hour   Intake 1640.42 ml   Output 950 ml   Net 690.42 ml           Labs:  Recent Labs     01/08/21  0445 01/09/21  0423 01/10/21  0415   WBC 12.7* 11.8* 11.9*   HGB 13.6 12.9 13.2   HCT 41.8 39.6 39.3     Lab Results   Component Value Date    CREATININE 0.8 01/10/2021    BUN 9 01/10/2021     01/10/2021    K 3.3 (L) 01/10/2021    CL 98 01/10/2021    CO2 24 01/10/2021     No results for input(s): LIPASE, AMYLASE in the last 72 hours.     Physical exam:   BP (!) 145/92   Pulse 85   Temp 98.4 °F (36.9 °C) (Oral)   Resp 18   Ht 6' 4\" (1.93 m)   Wt (!) 306 lb (138.8 kg)   SpO2 92%   BMI 37.25 kg/m²   General appearance: NAD  Head: NCAT  Neck: supple, no masses  Lungs: equal chest rise bilateral  Heart: S1S2 present  Abdomen: soft, minimally tender, nondistended,  Incisions clean and intact  Skin; no lesions  Gu: no cva tenderness  Extremities: extremities normal, atraumatic, no cyanosis or edema    A:  POD # 3 Laparoscopic repair of incarcerated strangulated umbilical hernia with small bowel resection, partial omentectomy    P:home likely tomorrow if continues to improve    Rusty Parkinson MD  1/10/2021

## 2021-01-11 ENCOUNTER — APPOINTMENT (OUTPATIENT)
Dept: CT IMAGING | Age: 51
DRG: 329 | End: 2021-01-11
Payer: OTHER GOVERNMENT

## 2021-01-11 VITALS
SYSTOLIC BLOOD PRESSURE: 119 MMHG | DIASTOLIC BLOOD PRESSURE: 81 MMHG | HEART RATE: 96 BPM | HEIGHT: 76 IN | OXYGEN SATURATION: 91 % | WEIGHT: 306 LBS | BODY MASS INDEX: 37.26 KG/M2 | RESPIRATION RATE: 22 BRPM | TEMPERATURE: 98.6 F

## 2021-01-11 LAB
METER GLUCOSE: 119 MG/DL (ref 74–99)
METER GLUCOSE: 135 MG/DL (ref 74–99)
METER GLUCOSE: 164 MG/DL (ref 74–99)

## 2021-01-11 PROCEDURE — 6360000002 HC RX W HCPCS: Performed by: SURGERY

## 2021-01-11 PROCEDURE — 6370000000 HC RX 637 (ALT 250 FOR IP): Performed by: SURGERY

## 2021-01-11 PROCEDURE — 2500000003 HC RX 250 WO HCPCS: Performed by: SURGERY

## 2021-01-11 PROCEDURE — 6370000000 HC RX 637 (ALT 250 FOR IP): Performed by: INTERNAL MEDICINE

## 2021-01-11 PROCEDURE — 94660 CPAP INITIATION&MGMT: CPT

## 2021-01-11 PROCEDURE — 94640 AIRWAY INHALATION TREATMENT: CPT

## 2021-01-11 PROCEDURE — 71250 CT THORAX DX C-: CPT

## 2021-01-11 PROCEDURE — 82962 GLUCOSE BLOOD TEST: CPT

## 2021-01-11 PROCEDURE — 2580000003 HC RX 258: Performed by: SURGERY

## 2021-01-11 PROCEDURE — 97116 GAIT TRAINING THERAPY: CPT

## 2021-01-11 RX ORDER — LEVOFLOXACIN 750 MG/1
750 TABLET ORAL ONCE
Status: COMPLETED | OUTPATIENT
Start: 2021-01-11 | End: 2021-01-11

## 2021-01-11 RX ORDER — LEVOFLOXACIN 500 MG/1
500 TABLET, FILM COATED ORAL DAILY
Qty: 7 TABLET | Refills: 0 | Status: SHIPPED | OUTPATIENT
Start: 2021-01-12 | End: 2021-01-19

## 2021-01-11 RX ADMIN — HYDROCHLOROTHIAZIDE 12.5 MG: 12.5 TABLET ORAL at 08:10

## 2021-01-11 RX ADMIN — LEVOFLOXACIN 750 MG: 750 TABLET, FILM COATED ORAL at 16:28

## 2021-01-11 RX ADMIN — ENOXAPARIN SODIUM 40 MG: 40 INJECTION SUBCUTANEOUS at 08:12

## 2021-01-11 RX ADMIN — METFORMIN HYDROCHLORIDE 500 MG: 500 TABLET ORAL at 16:28

## 2021-01-11 RX ADMIN — IPRATROPIUM BROMIDE AND ALBUTEROL SULFATE 1 AMPULE: .5; 3 SOLUTION RESPIRATORY (INHALATION) at 08:53

## 2021-01-11 RX ADMIN — LOSARTAN POTASSIUM 25 MG: 50 TABLET, FILM COATED ORAL at 08:10

## 2021-01-11 RX ADMIN — IPRATROPIUM BROMIDE AND ALBUTEROL SULFATE 1 AMPULE: .5; 3 SOLUTION RESPIRATORY (INHALATION) at 12:51

## 2021-01-11 RX ADMIN — METRONIDAZOLE 500 MG: 500 INJECTION, SOLUTION INTRAVENOUS at 06:21

## 2021-01-11 RX ADMIN — IPRATROPIUM BROMIDE AND ALBUTEROL SULFATE 1 AMPULE: .5; 3 SOLUTION RESPIRATORY (INHALATION) at 05:28

## 2021-01-11 RX ADMIN — WATER 2 G: 1 INJECTION INTRAMUSCULAR; INTRAVENOUS; SUBCUTANEOUS at 04:42

## 2021-01-11 RX ADMIN — METFORMIN HYDROCHLORIDE 500 MG: 500 TABLET ORAL at 08:10

## 2021-01-11 RX ADMIN — ASPIRIN 81 MG CHEWABLE TABLET 81 MG: 81 TABLET CHEWABLE at 08:10

## 2021-01-11 RX ADMIN — CLOPIDOGREL BISULFATE 75 MG: 75 TABLET ORAL at 08:11

## 2021-01-11 RX ADMIN — IPRATROPIUM BROMIDE AND ALBUTEROL SULFATE 1 AMPULE: .5; 3 SOLUTION RESPIRATORY (INHALATION) at 16:14

## 2021-01-11 NOTE — PROGRESS NOTES
GENERAL SURGERY  DAILY PROGRESS NOTE  1/11/2021    Subjective:  + BM, + flatus, no nausea or vomiting, + ambulation, anxious about recurrence risk, tolerating diet, reports using incentive spirometer 10x each hour he is awake    Objective:  /82   Pulse 94   Temp 98.1 °F (36.7 °C) (Oral)   Resp 16   Ht 6' 4\" (1.93 m)   Wt (!) 306 lb (138.8 kg)   SpO2 94%   BMI 37.25 kg/m²     GENERAL:  Laying in bed, no apparent distress  LUNGS:  No increased work of breathing, no cyanosis  CARDIOVASCULAR:  Extremities warm and well perfused,   ABDOMEN:  Soft, moderate appropriate brian incisional tenderness, moderately distended, incision c/d/i  SKIN: Warm and dry    Assessment/Plan:  45 y.o. male sp umbilical hernia primary repair with small bowel resection and partial omentectomy 1/7    -continue general diet  -encourage ambulation  -leukocytosis stable, AM labs pending  -electrolyte repletion per primary  -pain/nausea control  -abx stopped, ok for DC per surgery    Electronically signed by Sherif Norman MD on 1/11/2021 at 6:51 AM

## 2021-01-11 NOTE — PROGRESS NOTES
Patient checked at rest on Room Air, O2 sat 90% RA    Patient Ambulated 100 feet on Room Air, O2 sat remained at 90% RA while ambulating. Patient returned to bed, seated on room air, O2 sat 90% RA and increased to 93% RA within 1 minute.      Patient heart rate Tachy between 100-105    Patient reapplied 2L O2  95% 2L O2

## 2021-01-11 NOTE — PROGRESS NOTES
chest heaviness since yesterday. BUN/creatinine 11/0.8 with potassium 3.4 today. Blood sugars ranging 122-144. Troponin was less than 0.01 last night with WBC 11.8 hemoglobin 12.9. Temperature 98.7 with heart rate 81. Blood pressure currently 157/97 with O2 sat 93% on 2 L nasal cannula. Nursing stated they noticed that his O2 saturation has dropped down to 88% on room air earlier today. Patient denies unusual shortness of breath when told about his low O2 saturation. Urinary output is good. 1/10/2021  Patient seen examined on general medical floor. Patient denies any chest pain. Patient had good bowel movement and has less pressure sensation in his abdomen. Patient's breathing is about the same. Patient is compliant with incentive spirometer. O2 saturation 87% on room air this morning at rest.  BUN/creatinine is 9/0.8 with potassium of 3.3. Blood sugars ranging 128-160. WBC 11.9 with hemoglobin 13.2. Temperature 98.4 with heart rate 85. Blood pressure currently 130/83. Urine output is adequate. We will give her milliequivalents KCl x1 now. Get a chest x-ray PA and lateral.  Discussed with patient about possibly home O2 which he does not want to do at this time. 1/11/2021  Patient seen and examined on general medical floor. Patient states his postop discomfort is improving. Patient denies any chest pain. Patient denies any significant shortness of breath with exertion. Case discussed with PT today who ambulated the patient hallway with patient having O2 sats 86 to 88% post walk at rest.  Temperature 98.6 with heart rate 95. Blood pressure currently 119/81. Blood sugars ranging 125-141. Patient is very nervous at this time about everything that is going on in what appears to be him having continuing health problems over the past few years. Considering the patient has smoked for about 30 years 1/2 pack a day we will do a CT of the lung to further assess his postop hypoxia.     Past Medical History:    Past Medical History:   Diagnosis Date    Acute CVA (cerebrovascular accident) ([address] 10/28/2020    Anxiety     Depression     Headache(784.0)     Hypertension     Narcolepsy     Obesity     PTSD (post-traumatic stress disorder)     Tinnitus     Tobacco abuse     Unspecified sleep apnea      Past Surgical History:    Past Surgical History:   Procedure Laterality Date    HAND SURGERY      SMALL INTESTINE SURGERY N/A 1/7/2021    LAPAROSCOPIC POSS OPEN REPAIR INCARERATED INCISIONAL HERNIAsmall bowel resection performed by Rita Hunter MD at [address]       Medications Prior to Admission:    @  Prior to Admission medications    Medication Sig Start Date End Date Taking? Authorizing Provider   docusate sodium (COLACE) 100 MG capsule Take 1 capsule by mouth 2 times daily for 15 days 1/8/21 1/23/21 Yes Rita Hunter MD   oxyCODONE-acetaminophen (PERCOCET) 5-325 MG per tablet Take 1 tablet by mouth every 6 hours as needed for Pain for up to 5 days. 1/8/21 1/13/21 Yes Rita Hunter MD   ondansetron (ZOFRAN-ODT) 4 MG disintegrating tablet Take 1 tablet by mouth every 8 hours as needed for Nausea or Vomiting 1/8/21  Yes Rita Hunter MD   metFORMIN (GLUCOPHAGE) 500 MG tablet Take 500 mg by mouth 2 times daily (with meals)   Yes Historical Provider, MD   aspirin 81 MG chewable tablet Take 1 tablet by mouth daily 10/30/20   Debbie Frees, DO   enoxaparin (LOVENOX) 40 MG/0.4ML injection Inject 0.4 mLs into the skin daily 10/30/20   Debbie Frees, DO   atorvastatin (LIPITOR) 80 MG tablet Take 1 tablet by mouth nightly 10/31/20   Debbie Frees, DO   clopidogrel (PLAVIX) 75 MG tablet Take 1 tablet by mouth daily 10/30/20   Debbie Frees, DO   hydrochlorothiazide (HYDRODIURIL) 25 MG tablet Take 12.5 mg by mouth daily. Every other day    Historical Provider, MD   losartan (COZAAR) 25 MG tablet Take 25 mg by mouth daily.  Takes every other day    Historical Provider, MD vomiting, diarrhea or constipation. No melena or hematochezia. : No urinary complaints, dysuria, hematuria or frequency. MSK: No extremity weakness, paralysis or paresthesias. PHYSICAL EXAM:    Vitals:  /81   Pulse 95   Temp 98.6 °F (37 °C) (Oral)   Resp 22   Ht 6' 4\" (1.93 m)   Wt (!) 306 lb (138.8 kg)   SpO2 90%   BMI 37.25 kg/m²     General:  This is a 45 y.o. yo male who is alert and oriented in NAD  HEENT:  Head is normocephalic and atraumatic, PERRLA, EOMI, mucus membranes moist with no pharyngeal erythema or exudate. Neck:  Supple with no carotid bruits, JVD or thyromegaly.   No cervical adenopathy  CV:  Regular rate and rhythm, no murmurs  Lungs: Coarse breath sounds to auscultation bilaterally with no wheezes, rales or rhonchi  Abdomen:  +distended, postop abdomen, bowel sounds hypoactive  Extremities:  No edema, peripheral pulses intact bilaterally  Neuro:  Cranial nerves II-XII grossly intact; motor and sensory function intact with no focal deficits  Skin:  No rashes, lesions or wounds    DATA:  CBC with Differential:    Lab Results   Component Value Date    WBC 11.9 01/10/2021    RBC 4.13 01/10/2021    HGB 13.2 01/10/2021    HCT 39.3 01/10/2021     01/10/2021    MCV 95.2 01/10/2021    MCH 32.0 01/10/2021    MCHC 33.6 01/10/2021    RDW 13.7 01/10/2021    SEGSPCT 47 01/17/2012    SEGSPCT 64 10/20/2010    LYMPHOPCT 21.3 01/10/2021    MONOPCT 10.9 01/10/2021    EOSPCT 4 10/20/2010    BASOPCT 0.6 01/10/2021    MONOSABS 1.29 01/10/2021    LYMPHSABS 2.53 01/10/2021    EOSABS 0.37 01/10/2021    BASOSABS 0.07 01/10/2021     CMP:    Lab Results   Component Value Date     01/10/2021    K 3.3 01/10/2021    K 4.2 10/28/2020    CL 98 01/10/2021    CO2 24 01/10/2021    BUN 9 01/10/2021    CREATININE 0.8 01/10/2021    GFRAA >60 01/10/2021    LABGLOM >60 01/10/2021    GLUCOSE 147 01/10/2021    GLUCOSE 98 01/17/2012    PROT 7.2 01/07/2021    LABALBU 4.3 01/07/2021    LABALBU 4.6 01/17/2012    CALCIUM 9.1 01/10/2021    BILITOT 0.7 01/07/2021    ALKPHOS 65 01/07/2021    AST 21 01/07/2021    ALT 22 01/07/2021     Magnesium:    Lab Results   Component Value Date    MG 1.9 01/07/2021     Phosphorus:    Lab Results   Component Value Date    PHOS 3.5 10/29/2020     PT/INR:    Lab Results   Component Value Date    PROTIME 11.5 10/28/2020    INR 1.0 10/28/2020     Troponin:    Lab Results   Component Value Date    TROPONINI <0.01 01/09/2021     U/A:    Lab Results   Component Value Date    COLORU Yellow 10/28/2020    PROTEINU Negative 10/28/2020    PHUR 5.0 10/28/2020    CLARITYU Clear 10/28/2020    SPECGRAV <=1.005 10/28/2020    LEUKOCYTESUR Negative 10/28/2020    UROBILINOGEN 0.2 10/28/2020    BILIRUBINUR Negative 10/28/2020    BLOODU Negative 10/28/2020    GLUCOSEU Negative 10/28/2020     ABG:  No results found for: PH, PCO2, PO2, HCO3, BE, THGB, TCO2, O2SAT  HgBA1c:    Lab Results   Component Value Date    LABA1C 7.1 01/07/2021     FLP:    Lab Results   Component Value Date    TRIG 141 01/07/2021    HDL 33 01/07/2021    LDLCALC 34 01/07/2021    LABVLDL 28 01/07/2021     TSH:    Lab Results   Component Value Date    TSH 1.320 01/07/2021     IRON:  No results found for: IRON  LIPASE:    Lab Results   Component Value Date    LIPASE 64 01/06/2021       ASSESSMENT AND PLAN:      Patient Active Problem List    Diagnosis Date Noted    SBO (small bowel obstruction) ([facility] [address] 01/07/2021    Incarcerated umbilical hernia     Meckels diverticulum     Strangulated umbilical hernia     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 Incarcerated strangulated umbilical hernia with small bowel obstruction  2. Hypertension  3.   History of prior tobacco use  4.  Obesity  5. Noninsulin-dependent diabetes mellitus type 2 with hemoglobin A1c 7.1  6. History of sleep apnea-patient compliant with BiPAP at home  7.  Acute hypoxic respiratory failure postop    Plan:  Home medications reviewed  Admit to general medical floor  Monitor heart rate, blood pressure, O2 saturation  Consult general surgery  Glucoscans 4 times daily with sliding scale insulin  BiPAP 10/4 per patient request  Increase activity  DuoNeb aerosols changed to 4 times daily and every 4 hours as needed   Troponin-normal    Incentive spirometry  O2 nasal cannula  Check O2 saturation on room air at rest and with activity daily    CT of the lung without contrast today  Consults were  for discharge planning and for possible home TELMA Priest  1/11/2021  10:03 AM

## 2021-01-11 NOTE — PROGRESS NOTES
Physical Therapy Treatment Note    Room #:  5453/3126-22  Patient Name: Brianda Rutledge  YOB: 1970  MRN: [id number]    Referring Provider: Bk Camacho MD   Date of Service: 1/11/2021  Evaluating Physical Therapist: Allison Driver, PT #[id number]       Diagnosis: SBO (small bowel obstruction) ([facility] [address] [K56.609]        Patient Active Problem List   Diagnosis    Depression    HTN (hypertension)    Obesity    Tobacco abuse    Hyperlipidemia    PTSD (post-traumatic stress disorder)    Acute CVA (cerebrovascular accident) ([facility] [address]    Basilar artery thrombosis    SBO (small bowel obstruction) ([address]    Incarcerated umbilical hernia    Meckels diverticulum    Strangulated umbilical hernia      Tentative placement recommendation: Home    Equipment recommendation: None at this time    Prior Level of Function: Patient ambulated independently    Rehab Potential: good for baseline    Past medical history:   Past Medical History:   Diagnosis Date    Acute CVA (cerebrovascular accident) ([facility] [address] 10/28/2020    Anxiety     Depression     Headache(784.0)     Hypertension     Narcolepsy     Obesity     PTSD (post-traumatic stress disorder)     Tinnitus     Tobacco abuse     Unspecified sleep apnea      Past Surgical History:   Procedure Laterality Date    HAND SURGERY      SMALL INTESTINE SURGERY N/A 1/7/2021    LAPAROSCOPIC POSS OPEN REPAIR INCARERATED INCISIONAL HERNIAsmall bowel resection performed by Nazia Pepe MD at [facility] OR     Precautions: falls and O2, history cva right side affected, laparoscopic repair of incarcerated strangulated umbilical hernia with small bowel resection, partial omentectomy 1/7/2021    SUBJECTIVE:  Social history: Patient lives alone in a ranch home with 3 steps to enter with [address], [address] shower   Equipment owned: None,       [facility] Mobility Inpatient   How much difficulty turning over in bed?: None  How much difficulty sitting down on / standing up from a chair with arms?: None  How much difficulty moving from lying on back to sitting on side of bed?: None  How much help from another person moving to and from a bed to a chair?: A Little  How much help from another person needed to walk in hospital room?: A Little  How much help from another person for climbing 3-5 steps with a railing?: A Little  AM-PAC Inpatient Mobility Raw Score : 21  AM-PAC Inpatient T-Scale Score : 50.25  Mobility Inpatient CMS 0-100% Score: 28.97  Mobility Inpatient CMS G-Code Modifier : [place] cleared patient for PT treatment. OBJECTIVE;   Initial Evaluation  Date: 1/8/2021  Treatment Date:  1/11/2021   Short Term/ Long Term   Goals   Was pt agreeable to Eval/treatment? Yes   Yes To be met in 2 days   Pain level   4-5/10  abdomen None stated    Bed Mobility  Rolling: Not assessed  patient sitting Edge of bed     Scooting: Supervision   Rolling: Not assessed    Supine to sit: Independent   Sit to supine: Independent   Scooting: Independent  Rolling: Independent    Supine to sit: Independent    Sit to supine: Independent    Scooting: Independent     Transfers Sit to stand: Supervision cues for hand placement and safety  Sit to stand: Independent Sit to stand: Independent     Ambulation    1 x 90 feet, 2 x 110 feet using  IV pole with Supervision     Slight decreased right DF during swing phase of gait Patient ambulated 200 feet x 2 using no device with Supervision . Verbal cues were given for pursed lip breathing. Patient was wearing nasal cannula on 2L; SpO2 read 88% after ambulation, nursing was aware.   150 feet using  no device with Independent    Stair negotiation: ascended and descended   Not assessed    Not assessed due to patient stated he has no difficulties performing steps at home   3 steps 1 rail with supervision    ROM Within functional limits        Strength BUE:  4+/5  RLE:  4-/5  LLE:  4/5   Increase strength in affected mm groups by 1/3 grade   Balance Sitting EOB: good -  Dynamic Standing:  fair + Sitting EOB: good   Dynamic Standing: good  Sitting EOB:  good    Dynamic Standing: good       Patient education  Patient educated on role of Physical Therapy, risks of immobility, safety and plan of care, energy conservation,  importance of mobility while in hospital  and purse lip breathing      Patient response to education:   Pt verbalized understanding Pt demonstrated skill Pt requires further education in this area   Yes Partial Yes      Treatment:  Patient practiced and was instructed/facilitated in the following treatment:   Patient transferred to edge of bed and sat up x 5 minutes to increase dynamic sitting balance and activity tolerance. Patient ambulated in hallway and back to room. Patient was returned to bed at end of treatment. Therapeutic Exercises: not performed      At end of session, patient in bed with call light and phone within reach, all lines and tubes intact, nursing notified. Patient would benefit from continued skilled Physical Therapy to improve functional independence and quality of life. Patient's/ family goals   home        ASSESSMENT: Patient was able to progress ambulation distance and had good tolerance. However, patient continues to display decline in O2 levels during ambulation and needs cueing for pursed lip breathing and rest breaks to recover. Patient stated he has been ambulating independently on his own and does not feel need for therapy services.     Plan of Care:   -Standing Balance: Perform strengthening exercises in standing to promote motor control with or without upper extremity support   -Transfers: Provide instruction on proper hand and foot position for adequate transfer of weight onto lower extremities and use of gait device  -Gait: Gait training and Standing activities to improve: base of support, weight shift, weight bearing    -Endurance: Utilize Supervised activities to increase level of endurance to allow for

## 2021-01-12 LAB
BLOOD CULTURE, ROUTINE: NORMAL
CULTURE, BLOOD 2: NORMAL

## 2021-01-18 NOTE — DISCHARGE SUMMARY
Department of Internal Medicine        CHIEF COMPLAINT: Periumbilical/abdominal pain    Reason for Admission: Incarcerated umbilical hernia with small bowel obstruction    HISTORY OF PRESENT ILLNESS:      The patient is a 45 y.o. male who presents with mid abdominal pain. Pain started since 6 PM yesterday. Patient has a history of umbilical hernia for the past 14 years but is always able to reduce the hernia. Patient was unable to reduce it started last night and his discomfort became much worse. Patient went to the ED with this CAT scan showing periumbilical ventral hernia with herniation of small bowel. Patient developed secondary proximal small bowel obstruction because of the hernia. Patient also has a large hiatal hernia. Patient has a small infrarenal abdominal aortic aneurysm with maximum diameter of 3 cm. Patient had a lactic acid 3.1 in the ED. WBC was 14.2 this morning and patient was taken to surgery early this morning. Patient is seen postop. Patient denies any problem with chest pain, unusual shortness of breath, palpitations or dizziness. Patient has expected postop discomfort. 1/8/2021  Patient seen examined on general medical floor. Patient has postop discomfort. Patient also complains of some lower chest burning/pressure since surgery. Patient thinks he occasionally has some of that same discomfort at home off and on. Patient denies any known history of coronary artery disease or CHF nor has he ever had a stress test.  Patient also gets somewhat short of breath with any activity. BUN/creatinine was 16/0.9. Fasting blood sugar 136 today. WBC 12.7 with hemoglobin 13.6. Temperature is 98.3 with a heart rate of 81. Blood pressure 131/72. O2 sat currently is 91% on room air. 1/9/2021  Patient seen examined on general medical floor. Patient has expected postop discomfort. Patient denies fever, chest pain, dizziness or unusual shortness of breath.   Patient denies any more of the chest heaviness since yesterday. BUN/creatinine 11/0.8 with potassium 3.4 today. Blood sugars ranging 122-144. Troponin was less than 0.01 last night with WBC 11.8 hemoglobin 12.9. Temperature 98.7 with heart rate 81. Blood pressure currently 157/97 with O2 sat 93% on 2 L nasal cannula. Nursing stated they noticed that his O2 saturation has dropped down to 88% on room air earlier today. Patient denies unusual shortness of breath when told about his low O2 saturation. Urinary output is good. 1/10/2021  Patient seen examined on general medical floor. Patient denies any chest pain. Patient had good bowel movement and has less pressure sensation in his abdomen. Patient's breathing is about the same. Patient is compliant with incentive spirometer. O2 saturation 87% on room air this morning at rest.  BUN/creatinine is 9/0.8 with potassium of 3.3. Blood sugars ranging 128-160. WBC 11.9 with hemoglobin 13.2. Temperature 98.4 with heart rate 85. Blood pressure currently 130/83. Urine output is adequate. We will give her milliequivalents KCl x1 now. Get a chest x-ray PA and lateral.  Discussed with patient about possibly home O2 which he does not want to do at this time. 1/11/2021  Patient seen and examined on general medical floor. Patient states his postop discomfort is improving. Patient denies any chest pain. Patient denies any significant shortness of breath with exertion. Case discussed with PT today who ambulated the patient hallway with patient having O2 sats 86 to 88% post walk at rest.  Temperature 98.6 with heart rate 95. Blood pressure currently 119/81. Blood sugars ranging 125-141. Patient is very nervous at this time about everything that is going on in what appears to be him having continuing health problems over the past few years. Considering the patient has smoked for about 30 years 1/2 pack a day we will do a CT of the lung to further assess his postop hypoxia.     Patient ambulated in hallway today and O2 sat on RA stayed 90% or higher. Pt was discharge home and is to F/U with PCP about repeat sleep study and CPAP titration. Pt is stable for discharge    Past Medical History:    Past Medical History:   Diagnosis Date    Acute CVA (cerebrovascular accident) ([address] 10/28/2020    Anxiety     Depression     Headache(784.0)     Hypertension     Narcolepsy     Obesity     PTSD (post-traumatic stress disorder)     Tinnitus     Tobacco abuse     Unspecified sleep apnea      Past Surgical History:    Past Surgical History:   Procedure Laterality Date    HAND SURGERY      SMALL INTESTINE SURGERY N/A 1/7/2021    LAPAROSCOPIC POSS OPEN REPAIR INCARERATED INCISIONAL HERNIAsmall bowel resection performed by Rita Hunter MD at [address]       Medications Prior to Admission:    @  Prior to Admission medications    Medication Sig Start Date End Date Taking? Authorizing Provider   levoFLOXacin (LEVAQUIN) 500 MG tablet Take 1 tablet by mouth daily for 7 days 1/12/21 1/19/21 Yes Debbie Frees, DO   docusate sodium (COLACE) 100 MG capsule Take 1 capsule by mouth 2 times daily for 15 days 1/8/21 1/23/21 Yes Rita Hunter MD   ondansetron (ZOFRAN-ODT) 4 MG disintegrating tablet Take 1 tablet by mouth every 8 hours as needed for Nausea or Vomiting 1/8/21  Yes Rita Hunter MD   metFORMIN (GLUCOPHAGE) 500 MG tablet Take 500 mg by mouth 2 times daily (with meals)   Yes Historical Provider, MD   aspirin 81 MG chewable tablet Take 1 tablet by mouth daily 10/30/20   Debbie Frees, DO   enoxaparin (LOVENOX) 40 MG/0.4ML injection Inject 0.4 mLs into the skin daily 10/30/20   Debbie Frees, DO   atorvastatin (LIPITOR) 80 MG tablet Take 1 tablet by mouth nightly 10/31/20   Debbie Frees, DO   clopidogrel (PLAVIX) 75 MG tablet Take 1 tablet by mouth daily 10/30/20   Debbie Frees, DO   hydrochlorothiazide (HYDRODIURIL) 25 MG tablet Take 12.5 mg by mouth daily.  Every other day Historical Provider, MD   losartan (COZAAR) 25 MG tablet Take 25 mg by mouth daily. Takes every other day    Historical Provider, MD       Allergies:  Patient has no known allergies. Social History:   Social History     Socioeconomic History    Marital status: Single     Spouse name: Not on file    Number of children: Not on file    Years of education: Not on file    Highest education level: Not on file   Occupational History    Not on file   Social Needs    Financial resource strain: Not on file    Food insecurity     Worry: Not on file     Inability: Not on file    Transportation needs     Medical: Not on file     Non-medical: Not on file   Tobacco Use    Smoking status: Former Smoker     Packs/day: 1.50     Years: 20.00     Pack years: 30.00     Types: Cigarettes     Quit date: 2019     Years since quittin.1    Smokeless tobacco: Never Used   Substance and Sexual Activity    Alcohol use: No    Drug use: No    Sexual activity: Not on file   Lifestyle    Physical activity     Days per week: Not on file     Minutes per session: Not on file    Stress: Not on file   Relationships    Social connections     Talks on phone: Not on file     Gets together: Not on file     Attends Yarsanism service: Not on file     Active member of club or organization: Not on file     Attends meetings of clubs or organizations: Not on file     Relationship status: Not on file    Intimate partner violence     Fear of current or ex partner: Not on file     Emotionally abused: Not on file     Physically abused: Not on file     Forced sexual activity: Not on file   Other Topics Concern    Not on file   Social History Narrative    Not on file       Family History:   History reviewed. No pertinent family history. REVIEW OF SYSTEMS:    Gen: Patient denies any lightheadedness or dizziness. No LOC or syncope. No fevers or chills. HEENT: No earache, sore throat or nasal congestion.     Resp: Denies cough, hemoptysis or sputum production. Cardiac: Denies chest pain, SOB, diaphoresis or palpitations. GI: + Mid abdominal pain.  + nausea, vomiting, diarrhea or constipation. No melena or hematochezia. : No urinary complaints, dysuria, hematuria or frequency. MSK: No extremity weakness, paralysis or paresthesias. PHYSICAL EXAM:    Vitals:  /81   Pulse 96   Temp 98.6 °F (37 °C) (Oral)   Resp 22   Ht 6' 4\" (1.93 m)   Wt (!) 306 lb (138.8 kg)   SpO2 91%   BMI 37.25 kg/m²     General:  This is a 45 y.o. yo male who is alert and oriented in NAD  HEENT:  Head is normocephalic and atraumatic, PERRLA, EOMI, mucus membranes moist with no pharyngeal erythema or exudate. Neck:  Supple with no carotid bruits, JVD or thyromegaly.   No cervical adenopathy  CV:  Regular rate and rhythm, no murmurs  Lungs: Coarse breath sounds to auscultation bilaterally with no wheezes, rales or rhonchi  Abdomen:  +distended, postop abdomen, bowel sounds hypoactive  Extremities:  No edema, peripheral pulses intact bilaterally  Neuro:  Cranial nerves II-XII grossly intact; motor and sensory function intact with no focal deficits  Skin:  No rashes, lesions or wounds    DATA:  CBC with Differential:    Lab Results   Component Value Date    WBC 11.9 01/10/2021    RBC 4.13 01/10/2021    HGB 13.2 01/10/2021    HCT 39.3 01/10/2021     01/10/2021    MCV 95.2 01/10/2021    MCH 32.0 01/10/2021    MCHC 33.6 01/10/2021    RDW 13.7 01/10/2021    SEGSPCT 47 01/17/2012    SEGSPCT 64 10/20/2010    LYMPHOPCT 21.3 01/10/2021    MONOPCT 10.9 01/10/2021    EOSPCT 4 10/20/2010    BASOPCT 0.6 01/10/2021    MONOSABS 1.29 01/10/2021    LYMPHSABS 2.53 01/10/2021    EOSABS 0.37 01/10/2021    BASOSABS 0.07 01/10/2021     CMP:    Lab Results   Component Value Date     01/10/2021    K 3.3 01/10/2021    K 4.2 10/28/2020    CL 98 01/10/2021    CO2 24 01/10/2021    BUN 9 01/10/2021    CREATININE 0.8 01/10/2021    GFRAA >60 01/10/2021 LABGLOM >60 01/10/2021    GLUCOSE 147 01/10/2021    GLUCOSE 98 01/17/2012    PROT 7.2 01/07/2021    LABALBU 4.3 01/07/2021    LABALBU 4.6 01/17/2012    CALCIUM 9.1 01/10/2021    BILITOT 0.7 01/07/2021    ALKPHOS 65 01/07/2021    AST 21 01/07/2021    ALT 22 01/07/2021     Magnesium:    Lab Results   Component Value Date    MG 1.9 01/07/2021     Phosphorus:    Lab Results   Component Value Date    PHOS 3.5 10/29/2020     PT/INR:    Lab Results   Component Value Date    PROTIME 11.5 10/28/2020    INR 1.0 10/28/2020     Troponin:    Lab Results   Component Value Date    TROPONINI <0.01 01/09/2021     U/A:    Lab Results   Component Value Date    COLORU Yellow 10/28/2020    PROTEINU Negative 10/28/2020    PHUR 5.0 10/28/2020    CLARITYU Clear 10/28/2020    SPECGRAV <=1.005 10/28/2020    LEUKOCYTESUR Negative 10/28/2020    UROBILINOGEN 0.2 10/28/2020    BILIRUBINUR Negative 10/28/2020    BLOODU Negative 10/28/2020    GLUCOSEU Negative 10/28/2020     ABG:  No results found for: PH, PCO2, PO2, HCO3, BE, THGB, TCO2, O2SAT  HgBA1c:    Lab Results   Component Value Date    LABA1C 7.1 01/07/2021     FLP:    Lab Results   Component Value Date    TRIG 141 01/07/2021    HDL 33 01/07/2021    LDLCALC 34 01/07/2021    LABVLDL 28 01/07/2021     TSH:    Lab Results   Component Value Date    TSH 1.320 01/07/2021     IRON:  No results found for: IRON  LIPASE:    Lab Results   Component Value Date    LIPASE 64 01/06/2021       ASSESSMENT AND PLAN:      Patient Active Problem List    Diagnosis Date Noted    SBO (small bowel obstruction) ([facility] 01/07/2021    Incarcerated umbilical hernia     Meckels diverticulum     Strangulated umbilical hernia     Basilar artery thrombosis 10/29/2020    Acute CVA (cerebrovascular accident) ([facility] 10/28/2020    Tobacco abuse 06/25/2012    Hyperlipidemia 06/25/2012    PTSD (post-traumatic stress disorder) 06/25/2012    Depression 12/06/2010    HTN (hypertension) 12/06/2010    Obesity 12/06/2010 Impression:  1. Incarcerated strangulated umbilical hernia with small bowel obstruction  2. Hypertension  3. History of prior tobacco use  4. Obesity  5. Noninsulin-dependent diabetes mellitus type 2 with hemoglobin A1c 7.1  6. History of sleep apnea-patient compliant with BiPAP at home  7. Acute hypoxic respiratory failure postop  8.  Pulm infiltrates post op- possible pneumonia    Plan:  Discharge home today    Rx Levaquin 500 mg qd X 7 days    Cont home meds    F/U with PCP 1 wk or as directed    Amy Van D.O.  1/18/2021  3:44 PM

## 2021-01-20 ENCOUNTER — OFFICE VISIT (OUTPATIENT)
Dept: SURGERY | Age: 51
End: 2021-01-20

## 2021-01-20 VITALS
DIASTOLIC BLOOD PRESSURE: 80 MMHG | WEIGHT: 306 LBS | BODY MASS INDEX: 37.26 KG/M2 | SYSTOLIC BLOOD PRESSURE: 123 MMHG | TEMPERATURE: 97 F | HEIGHT: 76 IN | HEART RATE: 83 BPM

## 2021-01-20 DIAGNOSIS — Q43.0 MECKEL'S DIVERTICULUM: ICD-10-CM

## 2021-01-20 DIAGNOSIS — K56.609 SMALL BOWEL OBSTRUCTION (HCC): Primary | ICD-10-CM

## 2021-01-20 PROCEDURE — 99024 POSTOP FOLLOW-UP VISIT: CPT | Performed by: SURGERY

## 2021-01-20 NOTE — PROGRESS NOTES
General Surgery Office Note  Rigoberto Bedolla MD, MS    Patient's Name/Date of Birth: Julia Burnham / 1970    Date: January 20, 2021     Chief compaint: Postop visit from laparoscopic hernia repair with mesh    Surgeon: Richard Sneed MD    Patient Active Problem List   Diagnosis    Depression    HTN (hypertension)    Obesity    Tobacco abuse    Hyperlipidemia    PTSD (post-traumatic stress disorder)    [facility]    Basilar artery thrombosis    [facility]BO (small [facility] obstruction) ([facility]    Incarcerated umbilical hernia    Meckels diverticulum    Strangulated umbilical hernia       Subjective: Doing well, no new complaints, pain prior to surgery has resolved, tolerating diet, bowel function normal, anxious to return to normal physical activity    Objective:  /80   Pulse 83   Temp 97 °F (36.1 °C) (Temporal)   Ht 6' 4\" (1.93 m)   Wt (!) 306 lb (138.8 kg)   BMI 37.25 kg/m²   Labs:  No results for input(s): WBC, HGB, HCT in the last 72 hours. Invalid input(s): PLR  Lab Results   Component Value Date    CREATININE 0.8 01/10/2021    BUN 9 01/10/2021     01/10/2021    K 3.3 (L) 01/10/2021    CL 98 01/10/2021    CO2 24 01/10/2021     No results for input(s): LIPASE, AMYLASE in the last 72 hours.       Review of Systems -  General ROS: negative for - chills, fatigue or malaise  ENT ROS: negative for - hearing change, nasal congestion or nasal discharge  Allergy and Immunology ROS: negative for - hives, itchy/watery eyes or nasal congestion  Hematological and Lymphatic ROS: negative for - blood clots, blood transfusions, bruising or fatigue  Endocrine ROS: negative for - malaise/lethargy, mood swings, palpitations or polydipsia/polyuria  Respiratory ROS: negative for - sputum changes, stridor, tachypnea or wheezing  Cardiovascular ROS: negative for - irregular heartbeat, loss of consciousness, murmur or orthopnea  Gastrointestinal ROS: negative for - diarrhea, or hematemesis  Genito-Urinary ROS: negative for -  genital discharge, genital ulcers or hematuria  Musculoskeletal ROS: negative for - gait disturbance, muscle pain or muscular weakness  Psych/Neuro ROS: negative for - visual or auditory hallucinations, suicidal ideation    General appearance: AA, NAD  HEENT: NCAT, PERRLA, EOMI  Lungs: Clear, equal rise bilateral  Heart: Reg  Abdomen: soft, nondistended, nontender, incisions well healed, no signs of infection, no cellulitis, no hematoma  Ext: Atraumatic, no swelling  : No hernia     Path: Diagnosis:   A.  Hernia sac and contents: Hernia sac with mild chronic inflammation,   fibrosis, and pigment laden macrophages as well as mature adipose tissue   with focal fat necrosis     B.  Jejunum, resection: Benign small bowel with diverticulum, adhesions,   focal active enteritis, and vascular congestion     Assessment/Plan:  Bassam Zafar is a 45 y.o. male 2 weeks s/p laparoscopic small bowel resection and repair of incisional hernia for Meckels diverticulum. Doing well.     Resume activity gradually   No heavy lifting more than 20lbs for 4 weeks total  Pathology reviewed and copy provided  Follow up as needed    Physician Signature: Electronically signed by Dr. Domo Dhillon  [phone] (p)  1/20/2021  11:11 AM

## (undated) DEVICE — TROCAR: Brand: KII SLEEVE

## (undated) DEVICE — TRAY PROCED CUSTOM GASTROINTESTINAL

## (undated) DEVICE — PACK SURG LAP CHOLE CUSTOM

## (undated) DEVICE — TOWEL,OR,DSP,ST,BLUE,STD,6/PK,12PK/CS: Brand: MEDLINE

## (undated) DEVICE — 3M™ IOBAN™ 2 ANTIMICROBIAL INCISE DRAPE 6640EZ: Brand: IOBAN™ 2

## (undated) DEVICE — GOWN,SIRUS,NONRNF,SETINSLV,XL,20/CS: Brand: MEDLINE

## (undated) DEVICE — LAPAROSCOPIC ACCESS SYSTEM: Brand: ALEXIS LAPAROSCOPIC SYSTEM WITH KII FIOS FIRST ENTRY

## (undated) DEVICE — NEEDLE HYPO 25GA L1.5IN BLU POLYPR HUB S STL REG BVL STR

## (undated) DEVICE — RELOAD STPL L75MM OPN H3.8MM CLS 1.5MM WIRE DIA0.2MM REG

## (undated) DEVICE — SUTURE BAG: Brand: DEVON

## (undated) DEVICE — GARMENT,MEDLINE,DVT,INT,CALF,MED, GEN2: Brand: MEDLINE

## (undated) DEVICE — INSUFFLATION NEEDLE TO ESTABLISH PNEUMOPERITONEUM.: Brand: INSUFFLATION NEEDLE

## (undated) DEVICE — STAPLER INT DIA5MM 25 ABSRB STRP FIX DISP FOR HERN MESH

## (undated) DEVICE — PLUMEPORT LAPAROSCOPIC SMOKE FILTRATION DEVICE: Brand: PLUMEPORT ACTIV

## (undated) DEVICE — COVER,TABLE,44X90,STERILE: Brand: MEDLINE

## (undated) DEVICE — MESH SURG DIA4.5IN CIR SEPRA TECHNOLOGY VENTRALIGHT
Type: IMPLANTABLE DEVICE | Status: NON-FUNCTIONAL
Removed: 2021-01-07

## (undated) DEVICE — INSTRUMENT REPROC SEAL/DIVIDE OPEN LIGASURE IMPACT CRV LG JAW NANO-COAT 18CM

## (undated) DEVICE — BASIC SINGLE BASIN 1-LF: Brand: MEDLINE INDUSTRIES, INC.

## (undated) DEVICE — SYRINGE IRRIG 60ML SFT PLIABLE BLB EZ TO GRP 1 HND USE W/

## (undated) DEVICE — TOTAL TRAY, 16FR 10ML SIL FOLEY, URN: Brand: MEDLINE

## (undated) DEVICE — APPLICATOR MEDICATED 26 CC SOLUTION HI LT ORNG CHLORAPREP

## (undated) DEVICE — PMI PTFE COATED LAPAROSCOPIC WIRE L-HOOK 44 CM: Brand: PMI

## (undated) DEVICE — DOUBLE BASIN SET: Brand: MEDLINE INDUSTRIES, INC.

## (undated) DEVICE — YANKAUER,BULB TIP,W/O VENT,RIGID,STERILE: Brand: MEDLINE

## (undated) DEVICE — Z INACTIVE USE 2660664 SOLUTION IRRIG 3000ML 0.9% SOD CHL USP UROMATIC PLAS CONT

## (undated) DEVICE — SET MAJOR INSTR HOUSE

## (undated) DEVICE — ELECTRODE PT RET AD L9FT HI MOIST COND ADH HYDRGEL CORDED

## (undated) DEVICE — SHEET,DRAPE,40X58,STERILE: Brand: MEDLINE

## (undated) DEVICE — SYRINGE MED 10ML TRNSLUC BRL PLUNG BLK MRK POLYPR CTRL

## (undated) DEVICE — [HIGH FLOW INSUFFLATOR,  DO NOT USE IF PACKAGE IS DAMAGED,  KEEP DRY,  KEEP AWAY FROM SUNLIGHT,  PROTECT FROM HEAT AND RADIOACTIVE SOURCES.]: Brand: PNEUMOSURE

## (undated) DEVICE — NDL CNTR 40CT FM MAG: Brand: MEDLINE INDUSTRIES, INC.

## (undated) DEVICE — TROCAR: Brand: KII FIOS FIRST ENTRY

## (undated) DEVICE — MARKER,SKIN,WI/RULER AND LABELS: Brand: MEDLINE

## (undated) DEVICE — BLADE ES ELASTOMERIC COAT INSUL DURABLE BEND UPTO 90DEG

## (undated) DEVICE — STAPLER INT L75MM CUT LN L73MM STPL LN L77MM BLU B FRM 8

## (undated) DEVICE — SPONGE,LAP,12"X12",XR,ST,5/PK,40PK/CS: Brand: MEDLINE

## (undated) DEVICE — TUBING, SUCTION, 1/4" X 10', STRAIGHT: Brand: MEDLINE

## (undated) DEVICE — GLOVE ORANGE PI 7 1/2   MSG9075

## (undated) DEVICE — CAMERA STRYKER 1488 HD GEN

## (undated) DEVICE — COVER,LIGHT HANDLE,FLX,1/PK: Brand: MEDLINE INDUSTRIES, INC.